# Patient Record
Sex: FEMALE | Race: WHITE | URBAN - METROPOLITAN AREA
[De-identification: names, ages, dates, MRNs, and addresses within clinical notes are randomized per-mention and may not be internally consistent; named-entity substitution may affect disease eponyms.]

---

## 2023-07-12 PROBLEM — F51.05 INSOMNIA SECONDARY TO ANXIETY: Status: ACTIVE | Noted: 2023-07-12

## 2023-07-12 PROBLEM — F41.9 INSOMNIA SECONDARY TO ANXIETY: Status: ACTIVE | Noted: 2023-07-12

## 2023-07-13 ENCOUNTER — OFFICE VISIT (OUTPATIENT)
Dept: PRIMARY CARE | Facility: CLINIC | Age: 38
End: 2023-07-13
Payer: COMMERCIAL

## 2023-07-13 ENCOUNTER — LAB (OUTPATIENT)
Dept: LAB | Facility: LAB | Age: 38
End: 2023-07-13
Payer: COMMERCIAL

## 2023-07-13 VITALS
BODY MASS INDEX: 17.89 KG/M2 | HEART RATE: 69 BPM | SYSTOLIC BLOOD PRESSURE: 113 MMHG | WEIGHT: 101 LBS | TEMPERATURE: 98.1 F | DIASTOLIC BLOOD PRESSURE: 73 MMHG

## 2023-07-13 DIAGNOSIS — M25.561 ARTHRALGIA OF BOTH KNEES: ICD-10-CM

## 2023-07-13 DIAGNOSIS — M25.562 ARTHRALGIA OF BOTH KNEES: ICD-10-CM

## 2023-07-13 DIAGNOSIS — R06.09 DYSPNEA ON EXERTION: Primary | ICD-10-CM

## 2023-07-13 DIAGNOSIS — R06.09 DYSPNEA ON EXERTION: ICD-10-CM

## 2023-07-13 DIAGNOSIS — Z00.00 ENCOUNTER FOR PREVENTATIVE ADULT HEALTH CARE EXAMINATION: ICD-10-CM

## 2023-07-13 DIAGNOSIS — F41.8 MIXED ANXIETY AND DEPRESSIVE DISORDER: ICD-10-CM

## 2023-07-13 LAB
ALANINE AMINOTRANSFERASE (SGPT) (U/L) IN SER/PLAS: 28 U/L (ref 7–45)
ALBUMIN (G/DL) IN SER/PLAS: 4.7 G/DL (ref 3.4–5)
ALKALINE PHOSPHATASE (U/L) IN SER/PLAS: 42 U/L (ref 33–110)
ANION GAP IN SER/PLAS: 14 MMOL/L (ref 10–20)
ASPARTATE AMINOTRANSFERASE (SGOT) (U/L) IN SER/PLAS: 18 U/L (ref 9–39)
BASOPHILS (10*3/UL) IN BLOOD BY AUTOMATED COUNT: 0.03 X10E9/L (ref 0–0.1)
BASOPHILS/100 LEUKOCYTES IN BLOOD BY AUTOMATED COUNT: 0.6 % (ref 0–2)
BILIRUBIN TOTAL (MG/DL) IN SER/PLAS: 0.5 MG/DL (ref 0–1.2)
C REACTIVE PROTEIN (MG/L) IN SER/PLAS: <0.1 MG/DL
CALCIDIOL (25 OH VITAMIN D3) (NG/ML) IN SER/PLAS: 22 NG/ML
CALCIUM (MG/DL) IN SER/PLAS: 9.6 MG/DL (ref 8.6–10.6)
CARBON DIOXIDE, TOTAL (MMOL/L) IN SER/PLAS: 26 MMOL/L (ref 21–32)
CHLORIDE (MMOL/L) IN SER/PLAS: 104 MMOL/L (ref 98–107)
CHOLESTEROL (MG/DL) IN SER/PLAS: 171 MG/DL (ref 0–199)
CHOLESTEROL IN HDL (MG/DL) IN SER/PLAS: 72.9 MG/DL
CHOLESTEROL/HDL RATIO: 2.3
CITRULLINE ANTIBODY, IGG: <1 U/ML
CREATININE (MG/DL) IN SER/PLAS: 0.63 MG/DL (ref 0.5–1.05)
EOSINOPHILS (10*3/UL) IN BLOOD BY AUTOMATED COUNT: 0.06 X10E9/L (ref 0–0.7)
EOSINOPHILS/100 LEUKOCYTES IN BLOOD BY AUTOMATED COUNT: 1.1 % (ref 0–6)
ERYTHROCYTE DISTRIBUTION WIDTH (RATIO) BY AUTOMATED COUNT: 12.6 % (ref 11.5–14.5)
ERYTHROCYTE MEAN CORPUSCULAR HEMOGLOBIN CONCENTRATION (G/DL) BY AUTOMATED: 31.7 G/DL (ref 32–36)
ERYTHROCYTE MEAN CORPUSCULAR VOLUME (FL) BY AUTOMATED COUNT: 98 FL (ref 80–100)
ERYTHROCYTES (10*6/UL) IN BLOOD BY AUTOMATED COUNT: 4.49 X10E12/L (ref 4–5.2)
GFR FEMALE: >90 ML/MIN/1.73M2
GLUCOSE (MG/DL) IN SER/PLAS: 75 MG/DL (ref 74–99)
HEMATOCRIT (%) IN BLOOD BY AUTOMATED COUNT: 43.8 % (ref 36–46)
HEMOGLOBIN (G/DL) IN BLOOD: 13.9 G/DL (ref 12–16)
IMMATURE GRANULOCYTES/100 LEUKOCYTES IN BLOOD BY AUTOMATED COUNT: 0.2 % (ref 0–0.9)
LDL: 86 MG/DL (ref 0–99)
LEUKOCYTES (10*3/UL) IN BLOOD BY AUTOMATED COUNT: 5.3 X10E9/L (ref 4.4–11.3)
LYMPHOCYTES (10*3/UL) IN BLOOD BY AUTOMATED COUNT: 1.6 X10E9/L (ref 1.2–4.8)
LYMPHOCYTES/100 LEUKOCYTES IN BLOOD BY AUTOMATED COUNT: 30 % (ref 13–44)
MONOCYTES (10*3/UL) IN BLOOD BY AUTOMATED COUNT: 0.46 X10E9/L (ref 0.1–1)
MONOCYTES/100 LEUKOCYTES IN BLOOD BY AUTOMATED COUNT: 8.6 % (ref 2–10)
NEUTROPHILS (10*3/UL) IN BLOOD BY AUTOMATED COUNT: 3.18 X10E9/L (ref 1.2–7.7)
NEUTROPHILS/100 LEUKOCYTES IN BLOOD BY AUTOMATED COUNT: 59.5 % (ref 40–80)
NRBC (PER 100 WBCS) BY AUTOMATED COUNT: 0 /100 WBC (ref 0–0)
PLATELETS (10*3/UL) IN BLOOD AUTOMATED COUNT: 244 X10E9/L (ref 150–450)
POTASSIUM (MMOL/L) IN SER/PLAS: 4.5 MMOL/L (ref 3.5–5.3)
PROTEIN TOTAL: 7.3 G/DL (ref 6.4–8.2)
RHEUMATOID FACTOR (IU/ML) IN SERUM OR PLASMA: <10 IU/ML (ref 0–15)
SEDIMENTATION RATE, ERYTHROCYTE: <1 MM/H (ref 0–20)
SODIUM (MMOL/L) IN SER/PLAS: 139 MMOL/L (ref 136–145)
THYROTROPIN (MIU/L) IN SER/PLAS BY DETECTION LIMIT <= 0.05 MIU/L: 1.02 MIU/L (ref 0.44–3.98)
TRIGLYCERIDE (MG/DL) IN SER/PLAS: 63 MG/DL (ref 0–149)
UREA NITROGEN (MG/DL) IN SER/PLAS: 9 MG/DL (ref 6–23)
VLDL: 13 MG/DL (ref 0–40)

## 2023-07-13 PROCEDURE — 93000 ELECTROCARDIOGRAM COMPLETE: CPT | Performed by: INTERNAL MEDICINE

## 2023-07-13 PROCEDURE — 84443 ASSAY THYROID STIM HORMONE: CPT

## 2023-07-13 PROCEDURE — 86431 RHEUMATOID FACTOR QUANT: CPT

## 2023-07-13 PROCEDURE — 36415 COLL VENOUS BLD VENIPUNCTURE: CPT

## 2023-07-13 PROCEDURE — 99214 OFFICE O/P EST MOD 30 MIN: CPT | Performed by: INTERNAL MEDICINE

## 2023-07-13 PROCEDURE — 85025 COMPLETE CBC W/AUTO DIFF WBC: CPT

## 2023-07-13 PROCEDURE — 80061 LIPID PANEL: CPT

## 2023-07-13 PROCEDURE — 85652 RBC SED RATE AUTOMATED: CPT

## 2023-07-13 PROCEDURE — 83036 HEMOGLOBIN GLYCOSYLATED A1C: CPT

## 2023-07-13 PROCEDURE — 1036F TOBACCO NON-USER: CPT | Performed by: INTERNAL MEDICINE

## 2023-07-13 PROCEDURE — 80053 COMPREHEN METABOLIC PANEL: CPT

## 2023-07-13 PROCEDURE — 82306 VITAMIN D 25 HYDROXY: CPT

## 2023-07-13 PROCEDURE — 86140 C-REACTIVE PROTEIN: CPT

## 2023-07-13 PROCEDURE — 86200 CCP ANTIBODY: CPT

## 2023-07-13 RX ORDER — MIRTAZAPINE 7.5 MG/1
1 TABLET, FILM COATED ORAL NIGHTLY
COMMUNITY
Start: 2022-05-17 | End: 2023-07-13 | Stop reason: ALTCHOICE

## 2023-07-13 NOTE — PROGRESS NOTES
Subjective   Patient ID: Rosa Andrew is a 38 y.o. female who presents for NEW PT VISIT .  HPI  38-year-old female new patient of Dr. Silva here for establishment of care, last seen in July of last year.  Labs performed last year largely unremarkable.  She is concerned about possible long COVID.    COVID - 3-4 times most recently at the end of March. Notes since then has not been feeling herself, fatigue despite adequate sleep, forgetful, not able to be her dynamic self at work, feels that she is struggling. Gets intermittent palpitations and severe wiped out with exertion. Notes exhaustion and out of breath even when thinking about something to hard. Believes that anxiety and burnout are contributing but has had longstanding anxiety and this feels different. She had similar symptoms similar to this after covid infections   COVID in 2020 prevaccine - resulted in severe illness - symptoms started to improve after 6 months though unclear what her baseline is, then covid again with similar symptoms took a few months to recover.   Current symptoms are similar, sleep is better, has been working with herbalist using lavendar oil and chamomille tincture which has been allowing her to sleep. She also quit drinking and was diligent about her sleep routines. She continues to feel exhausted all the time. Has joint pains in her wrists, ankles and knees that is worst in the morning takes 1-2 hours to recover.    Has also been experiencing pain in the tops of her arms and backs of her legs.     Presented to the ED in April for leg cramps and shortness of breath. D-dimer negative, EKG unremarkable sinus arrhythmia, doppler lower extremities normal.     PMHx:  - Anxiety and insomnia previously on remeron which stopped working for her   - Old neck injury     Social:   - Former smoker - quit 2 months ago, 1-2 cigs/day, in mid to late 20s smoked 1/2 PPD x 5 years   - Former mild alcohol use, now discontinued - 1 drink would  result in horrible hangover or headache   - Intermittent marijuana   -    - Lives at home with roommate, going through a divorce ( for a few years)  No current outpatient medications     Objective     /73   Pulse 69   Temp 36.7 °C (98.1 °F)   Wt 45.8 kg (101 lb)   BMI 17.89 kg/m²     Physical Exam  General: Appears comfortable, NAD, appropriate affect  HEENT: NCAT, EOMI, pupils symmetric, no conjunctival erythema   Neck: Supple, no LAD   Heart: RRR S1 S2 no murmurs appreciated   Lungs: CTA bilaterally, no rhonchi, rales, or wheezes   Abdomen: Soft, NT/ND, no rebound or guarding, NABS   Extremities: no cyanosis or edema appreciated, reproducible to bilateral wrists and knees, no reduced range of motion, no swelling or overlying skin changes.  Neuro: AAO x 3, answers questions appropriately, no FND, gait unremarkable    Assessment/Plan   Problem List Items Addressed This Visit       Mixed anxiety and depressive disorder  Longstanding, self manages at present, previously followed by therapist, not currently interested in medical management.  Discussed consideration for Cymbalta to address polyarthritis with possible diagnosis of fibromyalgia but wishes to hold off on this for now.  Interested in I referral, will refer.    Relevant Orders    Follow Up In [facility] - [facility]     Other Visit Diagnoses       Dyspnea on exertion    -  Primary  Post COVID, with episodes in the past that improved.  There are no alarm features.  We will obtain work-up including blood work and a chest x-ray.  EKG reviewed unremarkable.  Referred to long COVID clinic.    Relevant Orders    CBC and Auto Differential    Comprehensive Metabolic Panel    TSH with reflex to Free T4 if abnormal    ECG 12 lead (Clinic Performed)    XR chest 2 views    Referral to COVID Recovery Clinic    Encounter for preventative adult health care examination      Labs in anticipation for  preventive care visit at next visit    Relevant Orders    Follow Up In [facility] - PCP - Health Maintenance    Arthralgia of both knees      Diven prolonged recovery time in the morning and bilateral symptoms will obtain inflammatory work-up.    Relevant Orders    C-Reactive Protein    Sedimentation Rate    Rheumatoid Factor    Citrulline Antibody, IgG     Follow-up for preventive care visit at next visit

## 2023-07-13 NOTE — PATIENT INSTRUCTIONS
It was a pleasure to see you today! Here is a list of things we have discussed and to follow up on:   I have ordered blood and/or urine tests for you to do today. The lab can be found on this floor (2nd floor) next to the pharmacy across from the elevators.   Stop by the first floor for your x-ray.   I have referred you to the COVID clinic to see if any additional workup is recommended   Referral to behavioral health to discuss depression and anxiety - Laron should be getting in touch with you.   Followup in 3 months

## 2023-07-14 LAB
ESTIMATED AVERAGE GLUCOSE FOR HBA1C: 97 MG/DL
HEMOGLOBIN A1C/HEMOGLOBIN TOTAL IN BLOOD: 5 %

## 2023-09-07 DIAGNOSIS — U09.9 LONG COVID: Primary | ICD-10-CM

## 2023-10-17 ENCOUNTER — APPOINTMENT (OUTPATIENT)
Dept: PRIMARY CARE | Facility: CLINIC | Age: 38
End: 2023-10-17
Payer: COMMERCIAL

## 2023-10-17 NOTE — PROGRESS NOTES
Subjective   Patient ID: Rosa Andrew is a 38 y.o. female who presents for No chief complaint on file..  HPI  38-year-old female here for follow-up visit, last seen for establishment of care in July out of concern for dyspnea on exertion post-COVID, referred to long COVID clinic and obtain EKG and chest x-ray also with arthralgia of her joints rheum work-up thus far unremarkable.  This was to have been a preventive care visit but was not scheduled as such.    7/23: Labs all good  8/23 no response to [facility]    PMHx:  - Anxiety and insomnia previously on remeron which stopped working for her-declined medical management at last visit referred to behavioral health discussed consideration for Cymbalta  - Old neck injury      Social:   - Former smoker - quit 2 months ago, 1-2 cigs/day, in mid to late 20s smoked 1/2 PPD x 5 years   - Former mild alcohol use, now discontinued - 1 drink would result in horrible hangover or headache   - Intermittent marijuana   -    - Lives at home with roommate, going through a divorce ( for a few years)    No current outpatient medications     Objective     There were no vitals taken for this visit.    Physical Exam    Assessment/Plan   Problem List Items Addressed This Visit    None    Health Maintenance  Cancer screening:   - Pap   Immunizations:

## 2023-11-14 ENCOUNTER — OFFICE VISIT (OUTPATIENT)
Dept: PRIMARY CARE | Facility: CLINIC | Age: 38
End: 2023-11-14
Payer: COMMERCIAL

## 2023-11-14 VITALS
BODY MASS INDEX: 17.71 KG/M2 | SYSTOLIC BLOOD PRESSURE: 121 MMHG | TEMPERATURE: 97.8 F | HEART RATE: 79 BPM | WEIGHT: 100 LBS | DIASTOLIC BLOOD PRESSURE: 77 MMHG

## 2023-11-14 DIAGNOSIS — U09.9 COVID-19 LONG HAULER: ICD-10-CM

## 2023-11-14 DIAGNOSIS — D17.0 LIPOMA OF FACE: Primary | ICD-10-CM

## 2023-11-14 DIAGNOSIS — M94.9 DISORDER OF CARTILAGE: ICD-10-CM

## 2023-11-14 DIAGNOSIS — F41.8 MIXED ANXIETY AND DEPRESSIVE DISORDER: ICD-10-CM

## 2023-11-14 PROCEDURE — 99214 OFFICE O/P EST MOD 30 MIN: CPT | Performed by: INTERNAL MEDICINE

## 2023-11-14 PROCEDURE — 1036F TOBACCO NON-USER: CPT | Performed by: INTERNAL MEDICINE

## 2023-11-14 RX ORDER — BUPROPION HYDROCHLORIDE 150 MG/1
150 TABLET ORAL EVERY MORNING
Qty: 30 TABLET | Refills: 1 | Status: SHIPPED | OUTPATIENT
Start: 2023-11-14 | End: 2024-01-27

## 2023-11-14 ASSESSMENT — PATIENT HEALTH QUESTIONNAIRE - PHQ9
10. IF YOU CHECKED OFF ANY PROBLEMS, HOW DIFFICULT HAVE THESE PROBLEMS MADE IT FOR YOU TO DO YOUR WORK, TAKE CARE OF THINGS AT HOME, OR GET ALONG WITH OTHER PEOPLE: VERY DIFFICULT
6. FEELING BAD ABOUT YOURSELF - OR THAT YOU ARE A FAILURE OR HAVE LET YOURSELF OR YOUR FAMILY DOWN: SEVERAL DAYS
7. TROUBLE CONCENTRATING ON THINGS, SUCH AS READING THE NEWSPAPER OR WATCHING TELEVISION: NEARLY EVERY DAY
5. POOR APPETITE OR OVEREATING: SEVERAL DAYS
9. THOUGHTS THAT YOU WOULD BE BETTER OFF DEAD, OR OF HURTING YOURSELF: SEVERAL DAYS
2. FEELING DOWN, DEPRESSED OR HOPELESS: MORE THAN HALF THE DAYS
SUM OF ALL RESPONSES TO PHQ9 QUESTIONS 1 & 2: 3
8. MOVING OR SPEAKING SO SLOWLY THAT OTHER PEOPLE COULD HAVE NOTICED. OR THE OPPOSITE, BEING SO FIGETY OR RESTLESS THAT YOU HAVE BEEN MOVING AROUND A LOT MORE THAN USUAL: MORE THAN HALF THE DAYS
SUM OF ALL RESPONSES TO PHQ QUESTIONS 1-9: 17
1. LITTLE INTEREST OR PLEASURE IN DOING THINGS: SEVERAL DAYS
4. FEELING TIRED OR HAVING LITTLE ENERGY: NEARLY EVERY DAY
3. TROUBLE FALLING OR STAYING ASLEEP: NEARLY EVERY DAY

## 2023-11-14 NOTE — PATIENT INSTRUCTIONS
Rosa   For [facility] clinic - see valeri [facility] at [facility] for assistance with scheduling  For SAD - purchase a bright light lamp 10,000 lux. Sit under this first thing in the morning for 30-60 minutes   For depressive symptoms - start WELLBUTRIN. Take 1/2 tablet for the first 1 week and then uptitrate to 1 full tablet daily if tolerating.   Lipoma - I have you referred you to general surgery. I recommend either Dr. Rajeev Valadez or Dr. Elroy Daily. You can stop by next door to [address] or call [phone] to have this scheduled.   TMJ things - referral to oral surgery     Followup 1-2 months

## 2023-11-14 NOTE — PROGRESS NOTES
"Subjective   Patient ID: Rosa Andrew is a 38 y.o. female who presents for Follow-up.  HPI  38-year-old female here for follow-up visit, last seen for establishment of care in July out of concern for dyspnea on exertion post-COVID, referred to long COVID clinic and obtain EKG and chest x-ray also with arthralgia of her joints rheum work-up thus far unremarkable.  This was to have been a preventive care visit but was not scheduled as such. Symptoms are largely unchanged since last being seen. She is following up with a therapist, now more accepting to her situation but still with severe fatigue all the time no matter what. She started to take an electrolyte packet a day which seems to help with brain fog slightly. She had trouble scheduling with the COVID clinic.   - Experiencing lipoma of forehead interested in consideration for removal, has had to wear bangs to cover it up.   - Teeth grinds and has TMJ with it was found to something with her jaw that causes tension in her neck.       7/23: Labs all good  8/23 no response to [facility]  10/23: FMLA form completed     PMHx:  - Anxiety and insomnia - now more \"worn out\" and possible depressive symptoms. previously on remeron which stopped working for her-declined medical management at last visit referred to behavioral health discussed consideration for Cymbalta.  Today she may be considering a low dose antidepressant but is concerned about her sensitivity to side effects. She has tried zoloft, prozac, mirtazapine, remote xanx in the past All of them resulted in more flatness as opposed to being functional. Unclear if now symptoms are related to physical exhaustion. Some component of SAD as well. Has had thoughts of being better off dead but no plans.   - Old neck injury      Social:   - Former smoker - quit 2 months ago, 1-2 cigs/day, in mid to late 20s smoked 1/2 PPD x 5 years   - Former mild alcohol use, now discontinued - 1 drink would result in horrible hangover or " headache   - Intermittent marijuana   -    - Lives at home with roommate, going through a divorce ( for a few years)     Current Outpatient Medications   Medication Instructions    buPROPion XL (WELLBUTRIN XL) 150 mg, oral, Every morning, Do not crush, chew, or split.        Objective     /77   Pulse 79   Temp 36.6 °C (97.8 °F)   Wt 45.4 kg (100 lb)   BMI 17.71 kg/m²     Physical Exam  General: Alert and oriented, in no apparent distress   HEENT: No conjunctival erythema, no external facial lesions   Lungs: Breathing comfortably  Skin: No evidence of skin breakdown. Lipoma on right upper forehead   Neuro: AAO x 3, answering questions appropriately, no obvious cranial nerve deficits    Assessment/Plan   Problem List Items Addressed This Visit       Mixed anxiety and depressive disorder   with significant symptoms of depression in association with anxiety.  Also with anhedonia.  Follow-up Wellbutrin to possibly assist with low mood.  We will start at half tab and uptitrate as tolerated.  Continue to follow-up with therapist.  We will follow-up in 1 to 2 months closely.    Relevant Medications    buPROPion XL (Wellbutrin XL) 150 mg 24 hr tablet    Other Relevant Orders    Follow Up In [facility] - PCP - Established     Other Visit Diagnoses       Lipoma of face    -  Primary  Interested in consideration for excision    No alarm features, intersted in excision.    Relevant Orders    Referral to Plastic Surgery    Referral to General Surgery    Disorder of cartilage        Relevant Orders    Referral to Oral Maxillofacial Surgery        COVID-19 long hauler    Current Assessment & Plan     With long-term symptoms, refer to COVID clinic  FMLA paperwork completed       Relevant Orders    Referral to COVID Recovery Clinic           Follow-up in 1 to 2 months

## 2024-01-01 SDOH — ECONOMIC STABILITY: FOOD INSECURITY: WITHIN THE PAST 12 MONTHS, THE FOOD YOU BOUGHT JUST DIDN’T LAST AND YOU DIDN’T HAVE MONEY TO GET MORE.: NEVER TRUE

## 2024-01-01 SDOH — HEALTH STABILITY: PHYSICAL HEALTH: ON AVERAGE, HOW MANY DAYS PER WEEK DO YOU ENGAGE IN MODERATE TO STRENUOUS EXERCISE (LIKE A BRISK WALK)?: 5 DAYS

## 2024-01-01 SDOH — ECONOMIC STABILITY: FOOD INSECURITY: WITHIN THE PAST 12 MONTHS, THE FOOD YOU BOUGHT JUST DIDN'T LAST AND YOU DIDN'T HAVE MONEY TO GET MORE.: NEVER TRUE

## 2024-01-01 SDOH — ECONOMIC STABILITY: HOUSING INSECURITY: IN THE LAST 12 MONTHS, HOW MANY PLACES HAVE YOU LIVED?: 2

## 2024-01-01 SDOH — HEALTH STABILITY: PHYSICAL HEALTH: ON AVERAGE, HOW MANY MINUTES DO YOU ENGAGE IN EXERCISE AT THIS LEVEL?: 60 MIN

## 2024-01-01 SDOH — ECONOMIC STABILITY: HOUSING INSECURITY
IN THE LAST 12 MONTHS, WAS THERE A TIME WHEN YOU DID NOT HAVE A STEADY PLACE TO SLEEP OR SLEPT IN A SHELTER (INCLUDING NOW)?: NO

## 2024-01-01 SDOH — ECONOMIC STABILITY: TRANSPORTATION INSECURITY
IN THE PAST 12 MONTHS, HAS THE LACK OF TRANSPORTATION KEPT YOU FROM MEDICAL APPOINTMENTS OR FROM GETTING MEDICATIONS?: NO

## 2024-01-01 SDOH — ECONOMIC STABILITY: INCOME INSECURITY: HOW HARD IS IT FOR YOU TO PAY FOR THE VERY BASICS LIKE FOOD, HOUSING, MEDICAL CARE, AND HEATING?: NOT VERY HARD

## 2024-01-01 SDOH — ECONOMIC STABILITY: INCOME INSECURITY: IN THE LAST 12 MONTHS, WAS THERE A TIME WHEN YOU WERE NOT ABLE TO PAY THE MORTGAGE OR RENT ON TIME?: NO

## 2024-01-01 SDOH — HEALTH STABILITY: MENTAL HEALTH
STRESS IS WHEN SOMEONE FEELS TENSE, NERVOUS, ANXIOUS, OR CAN'T SLEEP AT NIGHT BECAUSE THEIR MIND IS TROUBLED. HOW STRESSED ARE YOU?: TO SOME EXTENT

## 2024-01-01 SDOH — ECONOMIC STABILITY: FOOD INSECURITY: WITHIN THE PAST 12 MONTHS, YOU WORRIED THAT YOUR FOOD WOULD RUN OUT BEFORE YOU GOT MONEY TO BUY MORE.: NEVER TRUE

## 2024-01-01 SDOH — ECONOMIC STABILITY: TRANSPORTATION INSECURITY
IN THE PAST 12 MONTHS, HAS LACK OF TRANSPORTATION KEPT YOU FROM MEETINGS, WORK, OR FROM GETTING THINGS NEEDED FOR DAILY LIVING?: NO

## 2024-01-01 ASSESSMENT — SLEEP AND FATIGUE QUESTIONNAIRES
FATIGUE_INTERFERES_SOCIAL_LIFE: 7 STRONGLY AGREE
FATIGUE_MOST_DISABILING_SYMPTOM: 7 STRONGLY AGREE
VISUAL ANALOGUE FATIGUE SCALE (VAFS): 2
FATIGUE_CAUSES_FREQUENT_PROBLEMTS: 6
FATIGUE_INTERFERES_PHYSICAL_FUNCTIONING: 6
MY FATIGUE PREVENTS SUSTAINED PHYSICAL FUNCTIONING.: 6
EASILY_FATIGUED: 7 STRONGLY AGREE
FATIGUE_INTERFERES_SOCIAL_LIFE: 7 STRONGLY AGREE
MY MOTIVATION IS LOWER WHEN I AM FATIGUED.: 7 STRONGLY AGREE
MY MOTIVATION IS LOWER WHEN I AM FATIGUED.: 7 STRONGLY AGREE
FATIGUE_CAUSES_FREQUENT_PROBLEMTS: 6
MY FATIGUE PREVENTS SUSTAINED PHYSICAL FUNCTIONING.: 6
FATIGUE_INTERFERES_RESPONSIBILITIES: 6
FATIGUE_MOST_DISABILING_SYMPTOM: 7 STRONGLY AGREE
FATIGUE_INTERFERES_PHYSICAL_FUNCTIONING: 6
EASILY_FATIGUED: 7 STRONGLY AGREE
EXERCISE_BRINGS_ON_FATIGUE: 7 STRONGLY AGREE
EXERCISE_BRINGS_ON_FATIGUE: 7 STRONGLY AGREE
FATIGUE_INTERFERES_RESPONSIBILITIES: 6
AVERAGE_FSS_SCORE: 6.56

## 2024-01-01 ASSESSMENT — ANXIETY QUESTIONNAIRES
6. BECOMING EASILY ANNOYED OR IRRITABLE: NEARLY EVERY DAY
7. FEELING AFRAID AS IF SOMETHING AWFUL MIGHT HAPPEN: MORE THAN HALF THE DAYS
2. NOT BEING ABLE TO STOP OR CONTROL WORRYING: MORE THAN HALF THE DAYS
2. NOT BEING ABLE TO STOP OR CONTROL WORRYING: MORE THAN HALF THE DAYS
IF YOU CHECKED OFF ANY PROBLEMS ON THIS QUESTIONNAIRE, HOW DIFFICULT HAVE THESE PROBLEMS MADE IT FOR YOU TO DO YOUR WORK, TAKE CARE OF THINGS AT HOME, OR GET ALONG WITH OTHER PEOPLE: SOMEWHAT DIFFICULT
5. BEING SO RESTLESS THAT IT IS HARD TO SIT STILL: SEVERAL DAYS
1. FEELING NERVOUS, ANXIOUS, OR ON EDGE: MORE THAN HALF THE DAYS
6. BECOMING EASILY ANNOYED OR IRRITABLE: NEARLY EVERY DAY
IF YOU CHECKED OFF ANY PROBLEMS ON THIS QUESTIONNAIRE, HOW DIFFICULT HAVE THESE PROBLEMS MADE IT FOR YOU TO DO YOUR WORK, TAKE CARE OF THINGS AT HOME, OR GET ALONG WITH OTHER PEOPLE: SOMEWHAT DIFFICULT
4. TROUBLE RELAXING: SEVERAL DAYS
GAD7 TOTAL SCORE: 12
4. TROUBLE RELAXING: SEVERAL DAYS
7. FEELING AFRAID AS IF SOMETHING AWFUL MIGHT HAPPEN: MORE THAN HALF THE DAYS
1. FEELING NERVOUS, ANXIOUS, OR ON EDGE: MORE THAN HALF THE DAYS
5. BEING SO RESTLESS THAT IT IS HARD TO SIT STILL: SEVERAL DAYS
3. WORRYING TOO MUCH ABOUT DIFFERENT THINGS: SEVERAL DAYS
3. WORRYING TOO MUCH ABOUT DIFFERENT THINGS: SEVERAL DAYS

## 2024-01-01 ASSESSMENT — SOCIAL DETERMINANTS OF HEALTH (SDOH)
IN THE PAST 12 MONTHS, HAS THE ELECTRIC, GAS, OIL, OR WATER COMPANY THREATENED TO SHUT OFF SERVICE IN YOUR HOME?: NO
HOW OFTEN DO YOU GET TOGETHER WITH FRIENDS OR RELATIVES?: TWICE A WEEK
HOW OFTEN DO YOU ATTENT MEETINGS OF THE CLUB OR ORGANIZATION YOU BELONG TO?: 1 TO 4 TIMES PER YEAR
WITHIN THE LAST YEAR, HAVE YOU BEEN HUMILIATED OR EMOTIONALLY ABUSED IN OTHER WAYS BY YOUR PARTNER OR EX-PARTNER?: NO
IN A TYPICAL WEEK, HOW MANY TIMES DO YOU TALK ON THE PHONE WITH FAMILY, FRIENDS, OR NEIGHBORS?: MORE THAN THREE TIMES A WEEK
WITHIN THE LAST YEAR, HAVE TO BEEN RAPED OR FORCED TO HAVE ANY KIND OF SEXUAL ACTIVITY BY YOUR PARTNER OR EX-PARTNER?: NO
WITHIN THE LAST YEAR, HAVE YOU BEEN AFRAID OF YOUR PARTNER OR EX-PARTNER?: NO
IN A TYPICAL WEEK, HOW MANY TIMES DO YOU TALK ON THE PHONE WITH FAMILY, FRIENDS, OR NEIGHBORS?: MORE THAN THREE TIMES A WEEK
HOW OFTEN DO YOU ATTENT MEETINGS OF THE CLUB OR ORGANIZATION YOU BELONG TO?: 1 TO 4 TIMES PER YEAR
IN THE PAST 12 MONTHS, HAS THE ELECTRIC, GAS, OIL, OR WATER COMPANY THREATENED TO SHUT OFF SERVICE IN YOUR HOME?: NO
WITHIN THE LAST YEAR, HAVE TO BEEN RAPED OR FORCED TO HAVE ANY KIND OF SEXUAL ACTIVITY BY YOUR PARTNER OR EX-PARTNER?: NO
WITHIN THE LAST YEAR, HAVE YOU BEEN AFRAID OF YOUR PARTNER OR EX-PARTNER?: NO
ARE YOU MARRIED, WIDOWED, DIVORCED, SEPARATED, NEVER MARRIED, OR LIVING WITH A PARTNER?: LIVING WITH PARTNER
ARE YOU MARRIED, WIDOWED, DIVORCED, SEPARATED, NEVER MARRIED, OR LIVING WITH A PARTNER?: LIVING WITH PARTNER
WITHIN THE LAST YEAR, HAVE YOU BEEN KICKED, HIT, SLAPPED, OR OTHERWISE PHYSICALLY HURT BY YOUR PARTNER OR EX-PARTNER?: NO
WITHIN THE LAST YEAR, HAVE YOU BEEN HUMILIATED OR EMOTIONALLY ABUSED IN OTHER WAYS BY YOUR PARTNER OR EX-PARTNER?: NO
HOW OFTEN DO YOU ATTEND CHURCH OR RELIGIOUS SERVICES?: PATIENT DECLINED
HOW OFTEN DO YOU GET TOGETHER WITH FRIENDS OR RELATIVES?: TWICE A WEEK
DO YOU BELONG TO ANY CLUBS OR ORGANIZATIONS SUCH AS CHURCH GROUPS UNIONS, FRATERNAL OR ATHLETIC GROUPS, OR SCHOOL GROUPS?: YES
HOW OFTEN DO YOU ATTEND CHURCH OR RELIGIOUS SERVICES?: PATIENT DECLINED
WITHIN THE LAST YEAR, HAVE YOU BEEN KICKED, HIT, SLAPPED, OR OTHERWISE PHYSICALLY HURT BY YOUR PARTNER OR EX-PARTNER?: NO
DO YOU BELONG TO ANY CLUBS OR ORGANIZATIONS SUCH AS CHURCH GROUPS UNIONS, FRATERNAL OR ATHLETIC GROUPS, OR SCHOOL GROUPS?: YES

## 2024-01-01 ASSESSMENT — PATIENT HEALTH QUESTIONNAIRE - PHQ9
6. FEELING BAD ABOUT YOURSELF - OR THAT YOU ARE A FAILURE OR HAVE LET YOURSELF OR YOUR FAMILY DOWN: NOT AT ALL
1. LITTLE INTEREST OR PLEASURE IN DOING THINGS: NOT AT ALL
7. TROUBLE CONCENTRATING ON THINGS, SUCH AS READING THE NEWSPAPER OR WATCHING TELEVISION: SEVERAL DAYS
4. FEELING TIRED OR HAVING LITTLE ENERGY: NEARLY EVERY DAY
2. FEELING DOWN, DEPRESSED OR HOPELESS: SEVERAL DAYS
2. FEELING DOWN, DEPRESSED OR HOPELESS: SEVERAL DAYS
1. LITTLE INTEREST OR PLEASURE IN DOING THINGS: NOT AT ALL
7. TROUBLE CONCENTRATING ON THINGS, SUCH AS READING THE NEWSPAPER OR WATCHING TELEVISION: SEVERAL DAYS
8. MOVING OR SPEAKING SO SLOWLY THAT OTHER PEOPLE COULD HAVE NOTICED. OR THE OPPOSITE, BEING SO FIGETY OR RESTLESS THAT YOU HAVE BEEN MOVING AROUND A LOT MORE THAN USUAL: SEVERAL DAYS
10. IF YOU CHECKED OFF ANY PROBLEMS, HOW DIFFICULT HAVE THESE PROBLEMS MADE IT FOR YOU TO DO YOUR WORK, TAKE CARE OF THINGS AT HOME, OR GET ALONG WITH OTHER PEOPLE: NOT DIFFICULT AT ALL
5. POOR APPETITE OR OVEREATING: NOT AT ALL
10. IF YOU CHECKED OFF ANY PROBLEMS, HOW DIFFICULT HAVE THESE PROBLEMS MADE IT FOR YOU TO DO YOUR WORK, TAKE CARE OF THINGS AT HOME, OR GET ALONG WITH OTHER PEOPLE: NOT DIFFICULT AT ALL
5. POOR APPETITE OR OVEREATING: NOT AT ALL
9. THOUGHTS THAT YOU WOULD BE BETTER OFF DEAD, OR OF HURTING YOURSELF: NOT AT ALL
SUM OF ALL RESPONSES TO PHQ9 QUESTIONS 1 & 2: 1
9. THOUGHTS THAT YOU WOULD BE BETTER OFF DEAD, OR OF HURTING YOURSELF: NOT AT ALL
3. TROUBLE FALLING OR STAYING ASLEEP OR SLEEPING TOO MUCH: NOT AT ALL
SUM OF ALL RESPONSES TO PHQ QUESTIONS 1-9: 6
6. FEELING BAD ABOUT YOURSELF - OR THAT YOU ARE A FAILURE OR HAVE LET YOURSELF OR YOUR FAMILY DOWN: NOT AT ALL
3. TROUBLE FALLING OR STAYING ASLEEP: NOT AT ALL
8. MOVING OR SPEAKING SO SLOWLY THAT OTHER PEOPLE COULD HAVE NOTICED. OR THE OPPOSITE - BEING SO FIDGETY OR RESTLESS THAT YOU HAVE BEEN MOVING AROUND A LOT MORE THAN USUAL: SEVERAL DAYS
4. FEELING TIRED OR HAVING LITTLE ENERGY: NEARLY EVERY DAY

## 2024-01-01 ASSESSMENT — LIFESTYLE VARIABLES
HOW OFTEN DO YOU HAVE SIX OR MORE DRINKS ON ONE OCCASION: NEVER
AUDIT-C TOTAL SCORE: 1
DO_YOU_DRINK?: I DO THIS LESS OFTEN
HOW MANY STANDARD DRINKS CONTAINING ALCOHOL DO YOU HAVE ON A TYPICAL DAY: 1 OR 2
HOW MANY STANDARD DRINKS CONTAINING ALCOHOL DO YOU HAVE ON A TYPICAL DAY: 1 OR 2
USE_ALCOHOL_TO_HELP_SLEEP: YES
SKIP TO QUESTIONS 9-10: 1
HOW OFTEN DO YOU HAVE A DRINK CONTAINING ALCOHOL: MONTHLY OR LESS
HOW OFTEN DO YOU HAVE 6 OR MORE DRINKS ON ONE OCCASION: NEVER
HOW OFTEN DO YOU HAVE A DRINK CONTAINING ALCOHOL: MONTHLY OR LESS

## 2024-01-12 PROBLEM — R43.0 LOSS OF SENSE OF SMELL: Status: ACTIVE | Noted: 2021-02-04

## 2024-01-12 PROBLEM — R20.2 PARESTHESIA OF HAND, BILATERAL: Status: ACTIVE | Noted: 2021-02-04

## 2024-01-12 PROBLEM — M79.18 MYOFASCIAL MUSCLE PAIN: Status: ACTIVE | Noted: 2019-08-20

## 2024-01-12 PROBLEM — N94.3 PMS (PREMENSTRUAL SYNDROME): Status: ACTIVE | Noted: 2018-03-22

## 2024-01-12 PROBLEM — L20.84 INTRINSIC ECZEMA: Status: ACTIVE | Noted: 2018-03-22

## 2024-01-12 PROBLEM — G47.63 SLEEP-RELATED BRUXISM: Status: ACTIVE | Noted: 2019-08-20

## 2024-01-12 PROBLEM — F41.9 ANXIETY AND DEPRESSION: Status: ACTIVE | Noted: 2018-03-22

## 2024-01-12 PROBLEM — G47.01 INSOMNIA DUE TO MEDICAL CONDITION: Status: ACTIVE | Noted: 2021-02-04

## 2024-01-12 PROBLEM — R53.83 MALAISE AND FATIGUE: Status: ACTIVE | Noted: 2018-03-22

## 2024-01-12 PROBLEM — D17.0 LIPOMA OF SCALP: Status: ACTIVE | Noted: 2024-01-12

## 2024-01-12 PROBLEM — G62.9 POLYNEUROPATHY: Status: ACTIVE | Noted: 2021-02-04

## 2024-01-12 PROBLEM — D22.9 NEVUS: Status: ACTIVE | Noted: 2024-01-12

## 2024-01-12 PROBLEM — F32.A ANXIETY AND DEPRESSION: Status: ACTIVE | Noted: 2018-03-22

## 2024-01-12 PROBLEM — R19.7 DIARRHEA OF PRESUMED INFECTIOUS ORIGIN: Status: ACTIVE | Noted: 2018-03-22

## 2024-01-12 PROBLEM — F33.9 RECURRENT MAJOR DEPRESSION RESISTANT TO TREATMENT (CMS-HCC): Status: ACTIVE | Noted: 2020-07-28

## 2024-01-12 PROBLEM — R20.2 PARESTHESIA OF BOTH FEET: Status: ACTIVE | Noted: 2021-02-04

## 2024-01-12 PROBLEM — R43.2 LOSS OF TASTE: Status: ACTIVE | Noted: 2021-02-04

## 2024-01-12 PROBLEM — R53.83 FATIGUE: Status: ACTIVE | Noted: 2024-01-12

## 2024-01-12 PROBLEM — R09.89 CHRONIC SINUS COMPLAINTS: Status: ACTIVE | Noted: 2018-03-22

## 2024-01-12 PROBLEM — M54.2 NECK PAIN: Status: ACTIVE | Noted: 2018-03-22

## 2024-01-12 PROBLEM — Z77.120 MOLD EXPOSURE: Status: ACTIVE | Noted: 2018-08-16

## 2024-01-12 PROBLEM — R53.81 MALAISE AND FATIGUE: Status: ACTIVE | Noted: 2018-03-22

## 2024-01-12 PROBLEM — G44.219 EPISODIC TENSION-TYPE HEADACHE: Status: ACTIVE | Noted: 2018-03-22

## 2024-01-16 ENCOUNTER — CLINICAL SUPPORT (OUTPATIENT)
Dept: OTHER | Facility: CLINIC | Age: 39
End: 2024-01-16
Payer: COMMERCIAL

## 2024-01-16 DIAGNOSIS — Z86.16 PERSONAL HISTORY OF COVID-19: ICD-10-CM

## 2024-01-16 RX ORDER — MULTIVIT-MIN/IRON FUM/FOLIC AC 7.5 MG-4
1 TABLET ORAL DAILY
COMMUNITY

## 2024-01-16 ASSESSMENT — MONTREAL COGNITIVE ASSESSMENT (MOCA)
13. ORIENTATION SUBSCORE: 5
11. FOR EACH PAIR OF WORDS, WHAT CATEGORY DO THEY BELONG TO (OUT OF 2): 2
6. READ LIST OF DIGITS [FORWARD/BACKWARD]: 2
9. REPEAT EACH SENTENCE: 2
WHAT IS THE TOTAL SCORE (OUT OF 30): 18
4. NAME EACH OF THE THREE ANIMALS SHOWN: 0
8. SERIAL SUBTRACTION OF 7S: 3
WHAT LEVEL OF EDUCATION WAS ATTAINED: 0
7. [VIGILENCE] TAP WHEN HEARING DESIGNATED LETTER: 1
5. MEMORY TRIALS: 0
12. MEMORY INDEX SCORE: 2
VISUOSPATIAL/EXECUTIVE SUBSCORE: 0
10. [FLUENCY] NAME WORDS STARTING WITH DESIGNATED LETTER: 1

## 2024-01-19 NOTE — PROGRESS NOTES
NPV In-Person    Subjective   COVID-19 Infection Date:  December 2020 confirmed (Sx: Fever, Fatigue, Loss or disturbed smell, Headache, Sore throat, Cough, Pain on breathing, Loss or disturbed taste, Muscle pain, Diarrhea, Sinus pressure, Shortness of breath, Brain fog   December 2021 confirmed (Sx: Fever, Shortness of breath, Fatigue, Pain on breathing, Loss or disturbed smell, Runny nose, Muscle pain, Diarrhea, Brain fog, Sore throat, Sinus Pressure   03/24/2023   home antigen test confirmed (sx: Fever, Shortness of breath, Fatigue, Pain on breathing, Chest pain, Loss or disturbed smell, Runny nose, Headache, Muscle pain, Diarrhea, Brain fog, Sinus pressure   - no hospitalization, no treatment)    COVID-19 vaccine status: Pfizer    Occupation: full-time , currently on intermittent FMLA     Current Providers: PCP Dr Melanie Bee    Survey Scores: 01/2024  PHQ-9: 6    MAYTE-7: 12    Sleep Wellness: 9    FSS average: 6.56    Modified Ecog average: 3.67    MOCA: 18/22 (01/2024)  Overall Health: 60     38 y.o. female with a h/o COVID-19 in March 2023, anxiety and depression, presents to establish care at the  COVID Recovery Clinic with c/o Fatigue and PEM, cognitive and memory changes, leg pain, headache, feeling feverish, tinnitus, hoarse voice, dry eyes, SOB with exertion, palpitations, dizziness, rashes, diarrhea, bruises, mood changes    PASC symptoms were noticed most strongly last march, was experiencing exhaustion and confusion then  Prior to that had weakness, numbness tingling, rashes. Was not disruptive prior.  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  Difficulty with attention as well, memory is not great, writes everything down  Fatigue is present all the time, steady with worsening when exacerbated by activity  Sleeping OK, had bad insomnia after first COVID illness, saw neurologist then for carpal tunnel syndrome as well, was recommended melatonin and has been taking since then, if she does not take it then wakes up at 5am  Good sleep hygiene, that is helpful, not refreshed in the morning but able to get up  Not sleepy during the day, no nodding off, more so exhaustion  When she has a day off then can lay down and take a 3 hour nap without a problem, doesn't feel better  Snored as a child, unsure if as adult  Unsure about restless legs  Has had leg pain, feels like muscle spasms in the back of her legs and feet, feels jumpy, numbness and tingling as well but that is not as bad as it used to be, not sitting as much as work right now due to renovation at [email or web address]  Leg pain is intermittent, after activities, a few times per month since scaled back on activities, constant when she was more activity  Sometimes pain in her hands, more so after first round of COVID  Headache is always back of her head, sometimes the whole head, has some kind of headache every week, sometimes last for a few days, unsure how to describe the pain, feel different than headaches she had in the past  Does not feel like sinus of tension headache, feels like hot or how a fever headache would feel  No vision changes  Has popping in her ears, sometimes hears like a ringing or whoosing, lasts for a few seconds only  Notices it every few days, no hearing changes, sounds are bilateral  No sinus complaints  Lost her taste and smell the first time she had COVID, took a few months to come back, now  normal  Getting hoarse voice quickly, when talking typically, no dry mouth but has been noticing dry eyes and itching  SOB with activities, tries to do slow deep breathing to get back to normal, difficult  Also at rest will feel SOB even when just thinking, feels like she climbed a hill at work when at work at times  No SOB when laying down or sleeping  No cough, sometimes a cough when breathing in wrong, not typically  No asthma hx  When she had COVID last spring had chest pain, that resolved  Having palpitations, those were really bad at first, was seen in ED for SOB and heart beating out of chest, EKG showed mild tachycardia, with that reassurance has been ignoring this or breathing through it  In early 20s had panic attacks, this feels different, toosl are not working, not tied to how she is feeling emotionally, sometimes when just sitting on the cough  Palpitations feel fast and hard, no skipping, a few times per week now, last around half hour to 1 hour  Typically can bring them on with activities, takes a while to resolve when laying down  Sometimes wakes up with palpitations  Dizziness is fairly regular, every other day, unsure what brings that on, sometimes when standing up quickly, sometimes just sitting on the couch feels dizzy/vertigo  Feels like she is going to fall down when dizzy, not so much spinning  First time she had COVID in 2020 passed out a few times after taking a shower and walking back to her room, now feels like the beginning of that  Still has some presyncopal episodes after shower but that has improved, has been sitting down in the shower and able to stand more  Sometimes dizzy when just sitting  Unsure if dizzy when laying down, cannot bring dizziness on with looking side to side or up or down  Sometimes ankles with swell, not too often  Small patches of skin rash popping up on her body, itchy, the first time she had COVID had little spots of on her legs that were itchy, resolved after a  month or two  Still getting small red dots on her chest at times, slightly raised, looks like a mosquito bite, itchy  No new allergies that she is aware  Avoids gluten because it gives her GI problems and used to get migraines from that, eats a nutrient dense of anti-inflammatory diet  Stomach has not been feeling great, loud, not so much nausea anymore, getting diarrhea a few times per week  When she has diarrhea, then 4-5 BM per day, watery, cramping  Sometimes heartburn, not often  No urinary complaints  Bruising on the tops of her feet, on her legs and arms in places that she doesn't have a memory of getting injured  Some days has feverish feeling, temperature is normal, used to be all the time, since scaling back activity once every few weeks  Mood has not been great but she has been engaged in things to help her mood, walking to work around 1/2 hour per walk helps, staying in touch with friends, keeps as gratitude journal  Used to do yoga every day but doesn't feel steady enough on her feet to do that  Trying restorative yoga poses and meditate with that  Started Wellbutrin given history of seasonal depression, that does seem to help  Had a lot of anxiety the first few weeks that she was taking it, that subsided  Seeing therapist through [facility]  Worried about her job, on intermittent FMLA right now, when working 40hrs per week then was barely able to get through the day, still working a little less and declining opportunities, still feels like too much  Takes liquid IV that was recommended by co-worker, that makes her feel better, still really tired but not quite as foggy  Also taking multivitamin that has D in it    full ROS completed and all negative unless noted in HPI     Relevant prior healthcare visits:  -11/2023 PCP prescribed wellbutrin, continue follow-up with therapist, referral to [facility] for PASC symptoms  -04/2023 ED for chest pain and SOB, work-up reassuring    Relevant prior diagnostic  "studies:  -none available for review    Relevant prior laboratory values, unremarkable unless noted:  -07/2023 Vitamin D 22, HbA1c, Lipids, Citrulline Ab, RF, ESR, CRP, TSH, CMP, CBC/D  -04/2023 D-Dimer    Exercise routine: 60 minutes 5 days per week  Diet:  Weight hx: pre-COVID-19 105  lbs -> post-COVID-19 100  lbs  Substance use: former tobacco use, 1-2 servings ETOH monthly or less   Social:      Current Outpatient Medications:     buPROPion XL (Wellbutrin XL) 150 mg 24 hr tablet, Take 1 tablet (150 mg) by mouth once daily in the morning. Do not crush, chew, or split., Disp: 30 tablet, Rfl: 1    multivitamin with minerals tablet, Take 1 tablet by mouth once daily., Disp: , Rfl:     History reviewed. No pertinent past medical history.    Past Surgical History:   Procedure Laterality Date    OTHER SURGICAL HISTORY  05/17/2022    No history of surgery       Family History   Problem Relation Name Age of Onset    Fibromyalgia Mother      Multiple sclerosis Mother's Sister          x2    Multiple sclerosis Mother's Brother      Breast cancer Paternal Grandmother  60 - 69       Objective   /76   Pulse 93   Temp 37 °C (98.6 °F)   Ht 1.6 m (5' 3\")   Wt (!) 44.4 kg (97 lb 12.8 oz)   SpO2 100% Comment: Room Air  BMI 17.32 kg/m²     Physical Exam  Vitals reviewed.   Constitutional:       Appearance: Normal appearance.   HENT:      Mouth/Throat:      Comments: Wearing mask    Eyes:      Conjunctiva/sclera: Conjunctivae normal.   Cardiovascular:      Rate and Rhythm: Normal rate and regular rhythm.      Heart sounds: Normal heart sounds.   Pulmonary:      Effort: Pulmonary effort is normal.      Breath sounds: Normal breath sounds.   Abdominal:      General: Bowel sounds are normal.      Palpations: Abdomen is soft.   Musculoskeletal:         General: Normal range of motion.      Cervical back: Neck supple.   Skin:     General: Skin is warm and dry.   Neurological:      General: No focal deficit present. " "  Psychiatric:         Mood and Affect: Mood normal.         Cognition and Memory: Cognition normal.         Assessment/Plan   Problem List Items Addressed This Visit             ICD-10-CM       High    Post-acute sequelae of COVID-19 (PASC) - Primary U09.9     01/2024:  Fatigue and PEM, cognitive and memory changes, leg pain, headache, feeling feverish, tinnitus, hoarse voice, dry eyes, SOB with exertion, palpitations, dizziness, rashes, diarrhea, bruises, mood changes  -comprehensive blood work, please have this drawn in the morning, fasting except for water   -autonomic testing to evaluate for dysautonomia as a cause/contribution to your symptoms  -we will consider further lung and/or heart testing pending lab-work and autonomic testing results  [facility], you will be called to set up this appointment  -use diaphragmatic breathing exercises a few times per day, specifically when you are feeling symptomatic   -increase your water and salt intake, try compression leggings  -additional recommendations provided under \"Patient Education\" section              Relevant Orders    Tryptase    Serum Protein Electrophoresis    Troponin I, High Sensitivity    Vitamin B1, Whole Blood    Vitamin B6    Cortisol AM    DAVID with Reflex to WATSON    C-Reactive Protein    Ferritin    Folate    Hemoglobin A1C    CBC and Auto Differential    D-Dimer, Non VTE    Rheumatoid Factor    Sedimentation Rate    TSH with reflex to Free T4 if abnormal    Iron and TIBC    Citrulline Antibody, IgG    B-Type Natriuretic Peptide    Creatine Kinase    Comprehensive Metabolic Panel    Vitamin B12    Vitamin D 25-Hydroxy,Total (for eval of Vitamin D levels)    Referral to Psychology    Autonomic Testing       Medium    Anxiety and depression F41.9, F32.A    Relevant Orders    Referral to Psychology     Other Visit Diagnoses         Codes    Dizziness     R42    Relevant Orders    Autonomic Testing            "

## 2024-01-25 ENCOUNTER — OFFICE VISIT (OUTPATIENT)
Dept: OTHER | Facility: CLINIC | Age: 39
End: 2024-01-25
Payer: COMMERCIAL

## 2024-01-25 VITALS
HEIGHT: 63 IN | TEMPERATURE: 98.6 F | SYSTOLIC BLOOD PRESSURE: 109 MMHG | WEIGHT: 97.8 LBS | OXYGEN SATURATION: 100 % | DIASTOLIC BLOOD PRESSURE: 76 MMHG | BODY MASS INDEX: 17.33 KG/M2 | HEART RATE: 93 BPM

## 2024-01-25 DIAGNOSIS — U09.9 POST-ACUTE SEQUELAE OF COVID-19 (PASC): Primary | ICD-10-CM

## 2024-01-25 DIAGNOSIS — F41.9 ANXIETY AND DEPRESSION: ICD-10-CM

## 2024-01-25 DIAGNOSIS — R42 DIZZINESS: ICD-10-CM

## 2024-01-25 DIAGNOSIS — F32.A ANXIETY AND DEPRESSION: ICD-10-CM

## 2024-01-25 PROCEDURE — 99215 OFFICE O/P EST HI 40 MIN: CPT | Mod: ZK | Performed by: NURSE PRACTITIONER

## 2024-01-25 PROCEDURE — 1036F TOBACCO NON-USER: CPT | Performed by: NURSE PRACTITIONER

## 2024-01-25 PROCEDURE — 99205 OFFICE O/P NEW HI 60 MIN: CPT | Performed by: NURSE PRACTITIONER

## 2024-01-25 NOTE — PATIENT INSTRUCTIONS
It was my pleasure seeing you in the [facility] Recovery Clinic today.  We will focus on addressing the following symptoms discussed today: Fatigue and PEM, cognitive and memory changes, leg pain, headache, feeling feverish, tinnitus, hoarse voice, dry eyes, SOB with exertion, palpitations, dizziness, rashes, diarrhea, bruises, mood changes    My recommendations are as follows:  -comprehensive blood work, please have this drawn in the morning, fasting except for water   -autonomic testing to evaluate for dysautonomia as a cause/contribution to your symptoms  -we will consider further lung and/or heart testing pending lab-work and autonomic testing results  -referral to [facility] Psychologist Jasmyn Yadav, you will be called to set up this appointment  -use diaphragmatic breathing exercises a few times per day, specifically when you are feeling symptomatic   -increase your water and salt intake, try compression leggings  -try Yoga for POTS, here is a link to further information: [email or web address]     OTC treatment modalities that can help with post-viral gastrointestinal complaints  -Nutritional Roots 42 Billion CFU Probiotics taken at bedtime ([employer] or Amazon)  -Benefiber  -IBgard capsules to be taken   -[employer] azam     Tips to help improve brain fog and fatigue:  --avoid drinking Alcohol while recovering from COVID  --ensure to practice 30 minutes of exercise 7 days per week to keep BNDGF (brain derived neurotrophic growth factor) elevated as this will help in the regeneration of neurons, you may split exercise time up into 5 minute increments if this is better tolerated.   --Focus on eating a whole foods rich in fiber such as vegetables, fruits, beans, nuts, legumes, seeds, whole grains. Avoid processed foods and beverages. Eliminate added sugars, artificial sweeteners, processed oils, artificial dyes.  --slowly increase your activity by no more than 10% per  "week, rest when you feel tired  --utilize pacing techniques to manage fatigue, schedule rest times throughout the day so you do not run out of energy, more information to be found on this here: http://www.phsa.ca/health-info-site/Documents/post_covid-19_fatigue.pdf  --Review the  [employer] Talk on Managing Fatigue and Thinking Changes after COVID-19 here: https://www.hospitals.org/Health-Talks/articles/2022/05/managing-fatigue-and-thinking-changes-after-covid-19  --You can also find many helpful tips and tricks in this book: \"The Long COVID self-help guide, practical ways to manage symptoms\" by The Specialists from the [facility] [place]  --another book you may find helpful: \"Clearing the Fog\" by Dr. Atr Zelaya     Tips to help with tinnitus:  -consider acupuncture treatments through [employer]  -consider using a journal to document triggers such as increased salt intake, caffeine, stress  -reduce exposure to NSAIDs (Ibuprofen, Naproxen, Advil)  -reduce alcohol intake  -avoid artificial sweeteners including aspartame  -use ear protection when exposed to loud noise  -Acupressure for Tinnitus <https://www.youtube.com/watch?v=gR00ai4Tvx0&t=57s>   -Stress Management through mindfulness, yoga  -breath work with Kadeem bailey- humming bee breath- 5 minutes, twice daily  https://www.youLeapsetube.com/watch?v=dHU35f5eKe5&t=88s     General headache recommendations:  -avoid common triggers which include red wine, dark beer, aged cheese, nuts, onions, chocolate, aspartame, processed meats and nitrates, excessive caffeine, caffeine withdrawal, fasting, skipping meals, barometric pressure change, bright light, poor air quality, odors  -avoid overuse of OTC medications such as Ibuprofen, Naproxen, Excedrin, etc. as this can lead to rebound headaches  -maintain a stable and consistent sleep schedule, even on weekends  -stay well-hydrated with non-caffeinated beverages  -avoid skipping r delaying meals  -aim for " 30 minutes of movement per day, find something you enjoy so it doesn't feel like a chore  -Mind-Body and Stress Managements tools include acupuncture, chiropractic care, yoga, meditation, psychotherapy     We will send a message in [email or web address] or call you with the results of your tests.  Further recommendations will follow based on testing results and your symptoms.   Please return to COVID Recovery Clinic in 3 months, call [phone] or send a message through your [email or web address] azam if needed.    Please also consider attending  PICS support group for long-COVID and post-ICU patients. To contact support group, email [email or web address] or call [phone]

## 2024-01-25 NOTE — ASSESSMENT & PLAN NOTE
"01/2024:  Fatigue and PEM, cognitive and memory changes, leg pain, headache, feeling feverish, tinnitus, hoarse voice, dry eyes, SOB with exertion, palpitations, dizziness, rashes, diarrhea, bruises, mood changes  -comprehensive blood work, please have this drawn in the morning, fasting except for water   -autonomic testing to evaluate for dysautonomia as a cause/contribution to your symptoms  -we will consider further lung and/or heart testing pending lab-work and autonomic testing results  -[facility]asm[facility]av, you will be called to set up this appointment  -use diaphragmatic breathing exercises a few times per day, specifically when you are feeling symptomatic   -increase your water and salt intake, try compression leggings  -additional recommendations provided under \"Patient Education\" section       "

## 2024-01-26 DIAGNOSIS — F41.8 MIXED ANXIETY AND DEPRESSIVE DISORDER: ICD-10-CM

## 2024-01-27 RX ORDER — BUPROPION HYDROCHLORIDE 150 MG/1
150 TABLET ORAL EVERY MORNING
Qty: 30 TABLET | Refills: 1 | Status: SHIPPED | OUTPATIENT
Start: 2024-01-27 | End: 2024-02-12

## 2024-01-29 ENCOUNTER — APPOINTMENT (OUTPATIENT)
Dept: OBSTETRICS AND GYNECOLOGY | Facility: HOSPITAL | Age: 39
End: 2024-01-29
Payer: COMMERCIAL

## 2024-02-09 ENCOUNTER — LAB (OUTPATIENT)
Dept: LAB | Facility: HOSPITAL | Age: 39
End: 2024-02-09
Payer: COMMERCIAL

## 2024-02-09 DIAGNOSIS — U09.9 POST-ACUTE SEQUELAE OF COVID-19 (PASC): ICD-10-CM

## 2024-02-09 LAB
25(OH)D3 SERPL-MCNC: 37 NG/ML (ref 30–100)
ALBUMIN SERPL BCP-MCNC: 4.3 G/DL (ref 3.4–5)
ALP SERPL-CCNC: 40 U/L (ref 33–110)
ALT SERPL W P-5'-P-CCNC: 26 U/L (ref 7–45)
ANION GAP SERPL CALC-SCNC: 11 MMOL/L (ref 10–20)
AST SERPL W P-5'-P-CCNC: 23 U/L (ref 9–39)
BASOPHILS # BLD AUTO: 0.04 X10*3/UL (ref 0–0.1)
BASOPHILS NFR BLD AUTO: 0.9 %
BILIRUB SERPL-MCNC: 0.3 MG/DL (ref 0–1.2)
BNP SERPL-MCNC: 14 PG/ML (ref 0–99)
BUN SERPL-MCNC: 10 MG/DL (ref 6–23)
CALCIUM SERPL-MCNC: 8.7 MG/DL (ref 8.6–10.3)
CARDIAC TROPONIN I PNL SERPL HS: <3 NG/L (ref 0–34)
CCP IGG SERPL-ACNC: <1 U/ML
CHLORIDE SERPL-SCNC: 107 MMOL/L (ref 98–107)
CK SERPL-CCNC: 48 U/L (ref 0–215)
CO2 SERPL-SCNC: 26 MMOL/L (ref 21–32)
CORTIS AM PEAK SERPL-MSCNC: 10.9 UG/DL (ref 5–20)
CREAT SERPL-MCNC: 0.61 MG/DL (ref 0.5–1.05)
CRP SERPL-MCNC: <0.1 MG/DL
D DIMER PPP FEU-MCNC: 297 NG/ML FEU
EGFRCR SERPLBLD CKD-EPI 2021: >90 ML/MIN/1.73M*2
EOSINOPHIL # BLD AUTO: 0.08 X10*3/UL (ref 0–0.7)
EOSINOPHIL NFR BLD AUTO: 1.8 %
ERYTHROCYTE [DISTWIDTH] IN BLOOD BY AUTOMATED COUNT: 12.6 % (ref 11.5–14.5)
ERYTHROCYTE [SEDIMENTATION RATE] IN BLOOD BY WESTERGREN METHOD: 1 MM/H (ref 0–20)
EST. AVERAGE GLUCOSE BLD GHB EST-MCNC: 105 MG/DL
FERRITIN SERPL-MCNC: 63 NG/ML (ref 8–150)
FOLATE SERPL-MCNC: 17.1 NG/ML
GLUCOSE SERPL-MCNC: 85 MG/DL (ref 74–99)
HBA1C MFR BLD: 5.3 %
HCT VFR BLD AUTO: 38.4 % (ref 36–46)
HGB BLD-MCNC: 12.6 G/DL (ref 12–16)
IMM GRANULOCYTES # BLD AUTO: 0 X10*3/UL (ref 0–0.7)
IMM GRANULOCYTES NFR BLD AUTO: 0 % (ref 0–0.9)
IRON SATN MFR SERPL: 24 % (ref 25–45)
IRON SERPL-MCNC: 78 UG/DL (ref 35–150)
LYMPHOCYTES # BLD AUTO: 1.76 X10*3/UL (ref 1.2–4.8)
LYMPHOCYTES NFR BLD AUTO: 40.4 %
MCH RBC QN AUTO: 30.7 PG (ref 26–34)
MCHC RBC AUTO-ENTMCNC: 32.8 G/DL (ref 32–36)
MCV RBC AUTO: 93 FL (ref 80–100)
MONOCYTES # BLD AUTO: 0.26 X10*3/UL (ref 0.1–1)
MONOCYTES NFR BLD AUTO: 6 %
NEUTROPHILS # BLD AUTO: 2.22 X10*3/UL (ref 1.2–7.7)
NEUTROPHILS NFR BLD AUTO: 50.9 %
NRBC BLD-RTO: 0 /100 WBCS (ref 0–0)
PLATELET # BLD AUTO: 276 X10*3/UL (ref 150–450)
POTASSIUM SERPL-SCNC: 4.4 MMOL/L (ref 3.5–5.3)
PROT SERPL-MCNC: 6.8 G/DL (ref 6.4–8.2)
PROT SERPL-MCNC: 6.8 G/DL (ref 6.4–8.2)
RBC # BLD AUTO: 4.11 X10*6/UL (ref 4–5.2)
RHEUMATOID FACT SER NEPH-ACNC: <10 IU/ML (ref 0–15)
SODIUM SERPL-SCNC: 140 MMOL/L (ref 136–145)
TIBC SERPL-MCNC: 327 UG/DL (ref 240–445)
TSH SERPL-ACNC: 1.19 MIU/L (ref 0.44–3.98)
UIBC SERPL-MCNC: 249 UG/DL (ref 110–370)
VIT B12 SERPL-MCNC: 453 PG/ML (ref 211–911)
WBC # BLD AUTO: 4.4 X10*3/UL (ref 4.4–11.3)

## 2024-02-09 PROCEDURE — 84165 PROTEIN E-PHORESIS SERUM: CPT

## 2024-02-09 PROCEDURE — 86431 RHEUMATOID FACTOR QUANT: CPT

## 2024-02-09 PROCEDURE — 36415 COLL VENOUS BLD VENIPUNCTURE: CPT

## 2024-02-09 PROCEDURE — 86140 C-REACTIVE PROTEIN: CPT

## 2024-02-09 PROCEDURE — 82746 ASSAY OF FOLIC ACID SERUM: CPT

## 2024-02-09 PROCEDURE — 82306 VITAMIN D 25 HYDROXY: CPT

## 2024-02-09 PROCEDURE — 82728 ASSAY OF FERRITIN: CPT

## 2024-02-09 PROCEDURE — 83036 HEMOGLOBIN GLYCOSYLATED A1C: CPT

## 2024-02-09 PROCEDURE — 82550 ASSAY OF CK (CPK): CPT

## 2024-02-09 PROCEDURE — 82533 TOTAL CORTISOL: CPT

## 2024-02-09 PROCEDURE — 85652 RBC SED RATE AUTOMATED: CPT

## 2024-02-09 PROCEDURE — 83520 IMMUNOASSAY QUANT NOS NONAB: CPT

## 2024-02-09 PROCEDURE — 82607 VITAMIN B-12: CPT

## 2024-02-09 PROCEDURE — 86038 ANTINUCLEAR ANTIBODIES: CPT

## 2024-02-09 PROCEDURE — 84207 ASSAY OF VITAMIN B-6: CPT

## 2024-02-09 PROCEDURE — 84425 ASSAY OF VITAMIN B-1: CPT

## 2024-02-09 PROCEDURE — 83880 ASSAY OF NATRIURETIC PEPTIDE: CPT

## 2024-02-09 PROCEDURE — 80053 COMPREHEN METABOLIC PANEL: CPT

## 2024-02-09 PROCEDURE — 86200 CCP ANTIBODY: CPT

## 2024-02-09 PROCEDURE — 83540 ASSAY OF IRON: CPT

## 2024-02-09 PROCEDURE — 85025 COMPLETE CBC W/AUTO DIFF WBC: CPT

## 2024-02-09 PROCEDURE — 84165 PROTEIN E-PHORESIS SERUM: CPT | Performed by: PATHOLOGY

## 2024-02-09 PROCEDURE — 84155 ASSAY OF PROTEIN SERUM: CPT | Mod: 59

## 2024-02-09 PROCEDURE — 85379 FIBRIN DEGRADATION QUANT: CPT

## 2024-02-09 PROCEDURE — 84443 ASSAY THYROID STIM HORMONE: CPT

## 2024-02-09 PROCEDURE — 84484 ASSAY OF TROPONIN QUANT: CPT

## 2024-02-11 DIAGNOSIS — F41.8 MIXED ANXIETY AND DEPRESSIVE DISORDER: ICD-10-CM

## 2024-02-11 LAB — TRYPTASE SERPL-MCNC: 5.6 UG/L

## 2024-02-12 LAB — ANA SER QL HEP2 SUBST: NEGATIVE

## 2024-02-12 RX ORDER — BUPROPION HYDROCHLORIDE 150 MG/1
150 TABLET ORAL EVERY MORNING
Qty: 90 TABLET | Refills: 0 | Status: SHIPPED | OUTPATIENT
Start: 2024-02-12 | End: 2024-05-10 | Stop reason: WASHOUT

## 2024-02-13 LAB
ALBUMIN: 4.4 G/DL (ref 3.4–5)
ALPHA 1 GLOBULIN: 0.3 G/DL (ref 0.2–0.6)
ALPHA 2 GLOBULIN: 0.6 G/DL (ref 0.4–1.1)
BETA GLOBULIN: 0.6 G/DL (ref 0.5–1.2)
GAMMA GLOBULIN: 0.9 G/DL (ref 0.5–1.4)
PATH REVIEW-SERUM PROTEIN ELECTROPHORESIS: NORMAL
PROTEIN ELECTROPHORESIS COMMENT: NORMAL

## 2024-02-14 LAB — VIT B1 PYROPHOSHATE BLD-SCNC: 106 NMOL/L (ref 70–180)

## 2024-02-22 ENCOUNTER — HOSPITAL ENCOUNTER (OUTPATIENT)
Dept: NEUROLOGY | Facility: HOSPITAL | Age: 39
Discharge: HOME | End: 2024-02-22
Payer: COMMERCIAL

## 2024-02-22 DIAGNOSIS — U09.9 POST-ACUTE SEQUELAE OF COVID-19 (PASC): ICD-10-CM

## 2024-02-22 DIAGNOSIS — R42 DIZZINESS: ICD-10-CM

## 2024-02-22 PROCEDURE — 95924 ANS PARASYMP & SYMP W/TILT: CPT | Performed by: PSYCHIATRY & NEUROLOGY

## 2024-02-22 PROCEDURE — 95923 AUTONOMIC NRV SYST FUNJ TEST: CPT | Performed by: PSYCHIATRY & NEUROLOGY

## 2024-02-23 DIAGNOSIS — R42 DIZZINESS: Primary | ICD-10-CM

## 2024-02-27 NOTE — ASSESSMENT & PLAN NOTE
"03/2024:  Fatigue and PEM, cognitive and memory changes, leg pain, headache, tinnitus, hoarse voice, dry eyes, SOB with exertion, palpitations, dizziness, rashes, diarrhea, bruises, mood changes  -start Vitamin B12 supplement, you can add this to your multivitamin  -to further evaluate your heart, I ordered an echocardiogram and cardiac event monitor  -to further evaluate your lungs, I ordered a chest x-ray and pulmonary function testing  -please call back Jay Jay from Vascular Therapies office to schedule your dizziness evaluation with her  -referral to headache neurology  -referral to Tessie Layne for Physical Therapy at [facility] for dizziness, leg pain, fatigue and post-exertional malaise  -referral to Liz Jenkins for Speech therapy at [facility] for cognitive/memory complaints and hoarse voice  -additional recommendations provided under \"Patient Education\" section     01/2024:  Fatigue and PEM, cognitive and memory changes, leg pain, headache, feeling feverish, tinnitus, hoarse voice, dry eyes, SOB with exertion, palpitations, dizziness, rashes, diarrhea, bruises, mood changes  -comprehensive blood work, please have this drawn in the morning, fasting except for water   -autonomic testing to evaluate for dysautonomia as a cause/contribution to your symptoms  -we will consider further lung and/or heart testing pending lab-work and autonomic testing results  -referral to [facility] Psychologist Jasmyn Yadav, you will be called to set up this appointment  -use diaphragmatic breathing exercises a few times per day, specifically when you are feeling symptomatic   -increase your water and salt intake, try compression leggings  -additional recommendations provided under \"Patient Education\" section   -> [employer]    "

## 2024-02-27 NOTE — PROGRESS NOTES
[facility] Virtual The virtual visit conducted with Audio and Video.    Verbal consent was given for the following virtual visit, patient is currently located in [place]. All issues discussed and addressed below were done so without a physical examination. If it was felt the patient needed be seen in clinic in person they were directed there.     Subjective   COVID-19 Infection Date:  December 2020 confirmed (Sx: Fever, Fatigue, Loss or disturbed smell, Headache, Sore throat, Cough, Pain on breathing, Loss or disturbed taste, Muscle pain, Diarrhea, Sinus pressure, Shortness of breath, Brain fog   December 2021 confirmed (Sx: Fever, Shortness of breath, Fatigue, Pain on breathing, Loss or disturbed smell, Runny nose, Muscle pain, Diarrhea, Brain fog, Sore throat, Sinus Pressure   03/24/2023   home antigen test confirmed (sx: Fever, Shortness of breath, Fatigue, Pain on breathing, Chest pain, Loss or disturbed smell, Runny nose, Headache, Muscle pain, Diarrhea, Brain fog, Sinus pressure   - no hospitalization, no treatment)    COVID-19 vaccine status: Pfizer    Occupation: full-time , currently on intermittent FMLA     Current Providers: PCP Dr Melanie Bee    Survey Scores: 01/2024  PHQ-9: 6    MAYTE-7: 12    Sleep Wellness: 9    FSS average: 6.56    Modified Ecog average: 3.67    MOCA: 18/22 (01/2024)  Overall Health: 60     38 y.o. female with a h/o COVID-19 in March 2023, anxiety and depression, presents for follow-up at the  [facility] with c/o Fatigue and PEM, cognitive and memory changes, leg pain, headache, tinnitus, hoarse voice, dry eyes, SOB with exertion, palpitations, dizziness, rashes, diarrhea, bruises, mood changes    Autonomic testing was normal, although was quite symptomatic during the test  Very uncomfortable, dizziness/room spinning, tingling in hands, intense desire to pass out because she felt presyncopal  Noticing dizziness with any exertion, getting range of symptoms with  any exertion  To avoid this has been taking it very easy  When carrying the laundry up the stairs, an hour later sitting down feels very dizzy  Sometimes eating certain foods feels very dizzy  Also extreme tiredness, often the next day pain  Working part-time right now, coming up to the end of FMLA and paid sick time, has to make a decision on going part-time officially which would cause loss of health insurance  Afraid to go back to regular activity and wiping herself out, cannot do yoga for 15 minutes and mopping floors  Feels like breath is not working when she is exerting herself, doing deep breathing and has been doing breath work, cannot get air in, no cough although she noticed that when she has a meeting at work or hour-long phone call her voice gets hoarse  Feels more short of breath when her voice gets hoarse as well  Has not noted any irritants contributing to shortness of breath, wearing N95 mask outside of the house  No chest pain since immediately after COVID illness in March 2023  Palpitations, feels that HR is going too fast and too hard, typically when up and about, sometimes when she takes a nap wakes up feeling this way as well  Headaches are on the back of her head, present when she wakes up, sometimes gets a little bit better throughout the day, different than her typical tension headaches in the front of her head, back of the head feels hot and throbbing  Feels like a migraine  Neck pain as well, leg pain after exertion, leg pain feels like bone ache or shin splints, sometimes gets radiating hip pain  Has not felt feverish since last visit  Dry eyes are ongoing  Rashes are still present, has been taking Epsom salt baths and that seems to help, helps with pain as well  Cannot stay in the water very long because she feels as though she is going to pass out  Diarrhea is here and there, comes and goes, feels more related to amount of activity or stress that she has  Mood has been OK, wants to talk  to the Psychologist who specializes in LC, on wait list  Working on gratitude journal and doing meditation, has decent tool kit for that  Has been struggling with cognition and memory, feels slow, writing everything down, still feels out of it all the time  Sleeping well, 7 hours per night, does not feel refreshed during the day, searching for words  Not nodding off during the day  Has not started B12 supplement yet  Mother notes that she has MTHFR  Wants to look into all possibilities for what is happening with her body, wants to rule things out  Hx of MS on her mom's side of family, mother has fibromyalgia    Relevant prior healthcare visits:  -11/2023 PCP prescribed wellbutrin, continue follow-up with therapist, referral to [facility] for PASC symptoms  -04/2023 ED for chest pain and SOB, work-up reassuring    Relevant prior diagnostic studies:  -02/2024 autonomic testing normal    Relevant prior laboratory values, unremarkable unless noted:  -02/2024 Vitamin D, Vitamin B12 453, CMP, CK, BNP, Citrulline Ab, iron studies, TSH, ESR, RF, D-Dimer, CBC/D, HbA1c, Folate, Ferritin, CRP, DAVID, am cortisol, Vitamin B1, Troponin, SPEP, Tryptase   -07/2023 Vitamin D 22, HbA1c, Lipids, Citrulline Ab, RF, ESR, CRP, TSH, CMP, CBC/D  -04/2023 D-Dimer    Exercise routine: 60 minutes 5 days per week  Diet:  Weight hx: pre-COVID-19 105  lbs -> post-COVID-19 100  lbs  Substance use: former tobacco use, 1-2 servings ETOH monthly or less   Social:      Current Outpatient Medications:     buPROPion XL (Wellbutrin XL) 150 mg 24 hr tablet, TAKE 1 TABLET (150 MG) BY MOUTH ONCE DAILY IN THE MORNING. DO NOT CRUSH, CHEW, OR SPLIT., Disp: 90 tablet, Rfl: 0    multivitamin with minerals tablet, Take 1 tablet by mouth once daily., Disp: , Rfl:     History reviewed. No pertinent past medical history.    Past Surgical History:   Procedure Laterality Date    OTHER SURGICAL HISTORY  05/17/2022    No history of surgery       Family History   Problem  "Relation Name Age of Onset    Fibromyalgia Mother      Multiple sclerosis Mother's Sister          x2    Multiple sclerosis Mother's Brother      Breast cancer Paternal Grandmother  60 - 69       Objective   There were no vitals taken for this visit.    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     03/2024:  Fatigue and PEM, cognitive and memory changes, leg pain, headache, tinnitus, hoarse voice, dry eyes, SOB with exertion, palpitations, dizziness, rashes, diarrhea, bruises, mood changes  -start Vitamin B12 supplement, you can add this to your multivitamin  -to further evaluate your heart, I ordered an echocardiogram and cardiac event monitor  -to further evaluate your lungs, I ordered a chest x-ray and pulmonary function testing  -please call back Jay Jay from [facility] office to schedule your dizziness evaluation with her  -referral to headache neurology  -referral to Tessie Layne for Physical Therapy at [facility] for dizziness, leg pain, fatigue and post-exertional malaise  -referral to Lzi Jenkins for Speech therapy at [facility] for cognitive/memory complaints and hoarse voice  -additional recommendations provided under \"Patient Education\" section     01/2024:  Fatigue and PEM, cognitive and memory changes, leg pain, headache, feeling feverish, tinnitus, hoarse voice, dry eyes, SOB with exertion, palpitations, dizziness, rashes, diarrhea, bruises, mood changes  -comprehensive blood work, please have this drawn in the morning, fasting except for water   -autonomic testing to evaluate for dysautonomia as a cause/contribution to your symptoms  -we will consider further lung and/or heart testing pending " "lab-work and autonomic testing results  -referral to [facility] Psychologist Jasmyn Yadav, you will be called to set up this appointment  -use diaphragmatic breathing exercises a few times per day, specifically when you are feeling symptomatic   -increase your water and salt intake, try compression leggings  -additional recommendations provided under \"Patient Education\" section   -> B12 Liz Means           Relevant Orders    XR chest 2 views    Transthoracic Echo Complete    Holter Or Event Cardiac Monitor    Complete Pulmonary Function Test Pre/Post Bronchodialator (Spirometry Pre/Post/DLCO/Lung Volumes)    Pulmonary Stress Test (6 Min. Walk)    Referral to Physical Therapy    Referral to Speech Therapy    Referral to Neurology       Medium    Fatigue R53.83    Relevant Orders    Referral to Physical Therapy     Other Visit Diagnoses         Codes    Dyspnea, unspecified type     R06.00    Relevant Orders    XR chest 2 views    Transthoracic Echo Complete    Holter Or Event Cardiac Monitor    Complete Pulmonary Function Test Pre/Post Bronchodialator (Spirometry Pre/Post/DLCO/Lung Volumes)    Pulmonary Stress Test (6 Min. Walk)    Referral to Physical Therapy    Referral to Speech Therapy    Palpitations     R00.2    Relevant Orders    Holter Or Event Cardiac Monitor    Referral to Physical Therapy    Other chronic pain     G89.29    Relevant Orders    Referral to Physical Therapy    Chronic nonintractable headache, unspecified headache type     R51.9, G89.29    Relevant Orders    Referral to Neurology    Cognitive complaints     R41.9    Relevant Orders    Referral to Speech Therapy    Voice hoarseness     R49.0    Relevant Orders    Referral to Speech Therapy          "

## 2024-03-01 ENCOUNTER — TELEMEDICINE (OUTPATIENT)
Dept: OTHER | Facility: CLINIC | Age: 39
End: 2024-03-01
Payer: COMMERCIAL

## 2024-03-01 DIAGNOSIS — R49.0 VOICE HOARSENESS: ICD-10-CM

## 2024-03-01 DIAGNOSIS — G93.39 OTHER POST INFECTION AND RELATED FATIGUE SYNDROMES: ICD-10-CM

## 2024-03-01 DIAGNOSIS — R51.9 CHRONIC NONINTRACTABLE HEADACHE, UNSPECIFIED HEADACHE TYPE: ICD-10-CM

## 2024-03-01 DIAGNOSIS — R06.00 DYSPNEA, UNSPECIFIED TYPE: ICD-10-CM

## 2024-03-01 DIAGNOSIS — R41.9 COGNITIVE COMPLAINTS: ICD-10-CM

## 2024-03-01 DIAGNOSIS — G89.29 OTHER CHRONIC PAIN: ICD-10-CM

## 2024-03-01 DIAGNOSIS — G89.29 CHRONIC NONINTRACTABLE HEADACHE, UNSPECIFIED HEADACHE TYPE: ICD-10-CM

## 2024-03-01 DIAGNOSIS — U09.9 POST-ACUTE SEQUELAE OF COVID-19 (PASC): Primary | ICD-10-CM

## 2024-03-01 DIAGNOSIS — R00.2 PALPITATIONS: ICD-10-CM

## 2024-03-01 PROCEDURE — 99215 OFFICE O/P EST HI 40 MIN: CPT | Performed by: NURSE PRACTITIONER

## 2024-03-01 PROCEDURE — 1036F TOBACCO NON-USER: CPT | Performed by: NURSE PRACTITIONER

## 2024-03-01 PROCEDURE — 99215 OFFICE O/P EST HI 40 MIN: CPT | Mod: ZK,95 | Performed by: NURSE PRACTITIONER

## 2024-03-01 NOTE — PATIENT INSTRUCTIONS
It was my pleasure seeing you in the COVID Recovery Clinic today.  We will focus on addressing the following symptoms discussed today: Fatigue and PEM, cognitive and memory changes, leg pain, headache, tinnitus, hoarse voice, dry eyes, SOB with exertion, palpitations, dizziness, rashes, diarrhea, bruises, mood changes    My recommendations are as follows:  -start Vitamin B12 supplement, you can add this to your multivitamin  -to further evaluate your heart, I ordered an echocardiogram and cardiac event monitor  -to further evaluate your lungs, I ordered a chest x-ray and pulmonary function testing  -please call back Jay Jay from [facility] office to schedule your dizziness evaluation with her  -referral to headache neurology  -referral to Tessie Layne for Physical Therapy at [facility] for dizziness, leg pain, fatigue and post-exertional malaise  -referral to Liz Jenkins for Speech therapy at [facility] for cognitive/memory complaints and hoarse voice    OTC treatment modalities that can help with post-viral gastrointestinal complaints  -Nutritional Roots 42 Billion CFU Probiotics taken at bedtime ([employer] or [employer])  -Benefiber  -IBgard capsules to be taken   -[employer] azam     Tips to help improve brain fog and fatigue:  --avoid drinking Alcohol while recovering from COVID  --ensure to practice 30 minutes of exercise 7 days per week to keep BNDGF (brain derived neurotrophic growth factor) elevated as this will help in the regeneration of neurons, you may split exercise time up into 5 minute increments if this is better tolerated.   --Focus on eating a whole foods rich in fiber such as vegetables, fruits, beans, nuts, legumes, seeds, whole grains. Avoid processed foods and beverages. Eliminate added sugars, artificial sweeteners, processed oils, artificial dyes.  --slowly increase your activity by no more than 10% per week, rest when you feel tired  --utilize pacing techniques to manage  "fatigue, schedule rest times throughout the day so you do not run out of energy, more information to be found on this here: http://www.phsa.ca/health-info-site/Documents/post_covid-19_fatigue.pdf  --Review the  Health Talk on Managing Fatigue and Thinking Changes after COVID-19 here: https://www.hospitals.org/Health-Talks/articles/2022/05/managing-fatigue-and-thinking-changes-after-covid-19  --You can also find many helpful tips and tricks in this book: \"The Long COVID self-help guide, practical ways to manage symptoms\" by The Specialists from the [facility] [place]  --another book you may find helpful: \"Clearing the Fog\" by Dr. Art Zelaya     General headache recommendations:  -avoid common triggers which include red wine, dark beer, aged cheese, nuts, onions, chocolate, aspartame, processed meats and nitrates, excessive caffeine, caffeine withdrawal, fasting, skipping meals, barometric pressure change, bright light, poor air quality, odors  -avoid overuse of OTC medications such as Ibuprofen, Naproxen, Excedrin, etc. as this can lead to rebound headaches  -maintain a stable and consistent sleep schedule, even on weekends  -stay well-hydrated with non-caffeinated beverages  -avoid skipping r delaying meals  -aim for 30 minutes of movement per day, find something you enjoy so it doesn't feel like a chore  -Mind-Body and Stress Managements tools include acupuncture, chiropractic care, yoga, meditation, psychotherapy     Tips to help with tinnitus:  -consider acupuncture treatments through [employer]  -consider using a journal to document triggers such as increased salt intake, caffeine, stress  -reduce exposure to NSAIDs (Ibuprofen, Naproxen, Advil)  -reduce alcohol intake  -avoid artificial sweeteners including aspartame  -use ear protection when exposed to loud noise  -Acupressure for Tinnitus <https://www.youStyle on Screen.com/watch?v=aK22hd0Vhe6&t=57s>   -Stress Management through mindfulness, " yoga  -breath work with Kadeem bailey- humming bee breath- 5 minutes, twice daily  https://www.youtube.com/watch?v=zOL68v2rLg9&t=88s     We will send a message in [employer] or call you with the results of your tests.  Further recommendations will follow based on testing results and your symptoms.   Please return to COVID Recovery Clinic in 2 months, call [phone] or send a message through your [employer] azam if needed.    Please also consider attending  PICS support group for long-COVID and post-ICU patients. To contact support group, email [email or web address] or call [phone]

## 2024-03-04 ENCOUNTER — APPOINTMENT (OUTPATIENT)
Dept: DERMATOLOGY | Facility: CLINIC | Age: 39
End: 2024-03-04
Payer: COMMERCIAL

## 2024-03-12 PROBLEM — R19.7 DIARRHEA OF PRESUMED INFECTIOUS ORIGIN: Status: RESOLVED | Noted: 2018-03-22 | Resolved: 2024-03-12

## 2024-03-12 PROBLEM — R53.83 MALAISE AND FATIGUE: Status: RESOLVED | Noted: 2018-03-22 | Resolved: 2024-03-12

## 2024-03-12 PROBLEM — R43.2 LOSS OF TASTE: Status: RESOLVED | Noted: 2021-02-04 | Resolved: 2024-03-12

## 2024-03-12 PROBLEM — R09.89 CHRONIC SINUS COMPLAINTS: Status: RESOLVED | Noted: 2018-03-22 | Resolved: 2024-03-12

## 2024-03-12 PROBLEM — R20.2 PARESTHESIA OF HAND, BILATERAL: Status: RESOLVED | Noted: 2021-02-04 | Resolved: 2024-03-12

## 2024-03-12 PROBLEM — R53.81 MALAISE AND FATIGUE: Status: RESOLVED | Noted: 2018-03-22 | Resolved: 2024-03-12

## 2024-03-12 PROBLEM — R53.83 FATIGUE: Status: RESOLVED | Noted: 2024-01-12 | Resolved: 2024-03-12

## 2024-03-12 PROBLEM — R43.0 LOSS OF SENSE OF SMELL: Status: RESOLVED | Noted: 2021-02-04 | Resolved: 2024-03-12

## 2024-03-12 PROBLEM — R20.2 PARESTHESIA OF BOTH FEET: Status: RESOLVED | Noted: 2021-02-04 | Resolved: 2024-03-12

## 2024-03-12 NOTE — PROGRESS NOTES
Subjective   Rosa Andrew is a 38 y.o. female who is here for an annual gyn exam.   Concerns: Patient reports having some white/yellow discharge and a little burning before her period. Denies itching/odor. This doesn't happen before each period, but has been going on irregularly for the last few months. Patient denies any symptoms currently.    She also states that before her period she has some vulvar skin sensitives. Nothing now.    Patient declines talking about birth control today.     Patient's last menstrual period was 2024 (exact date).  Periods are regular every 28-30 days, lasting 3 days normally, though last period was 7 days.   Dysmenorrhea:mild, occurring first 1-2 days of flow.     Last pap: 22 WNL HPV neg; patient was told given LEEP she needed pap yearly   History of abnormal Pap smear: yes - per patient hx of LEEP in   STI testing: yes - all   HPV vaccination: No , declines #1 today     SoHx:  Vaginal hygiene: soap and water  Loss of sexual desire: No  Pain with sex: No  Able to orgasm: Yes   Living Situation: with partner  Safe at home: Yes   Hx of DV: No   School/Employment:    Exercise: Yes , as much as possible with long COVID    Substance:   Tobacco Use: Medium Risk (3/13/2024)    Patient History     Smoking Tobacco Use: Former     Smokeless Tobacco Use: Never     Passive Exposure: Not on file      Social History     Substance and Sexual Activity   Drug Use Yes    Types: Marijuana      Social History     Substance and Sexual Activity   Alcohol Use Not Currently       Social History     Substance and Sexual Activity   Sexual Activity Yes    Partners: Female     OB History          0    Para   0    Term   0       0    AB   0    Living   0         SAB   0    IAB   0    Ectopic   0    Multiple   0    Live Births   0               Past Medical History:   Diagnosis Date    Abnormal Pap smear of cervix     Long COVID      Past Surgical History:   Procedure  "Laterality Date    CERVICAL BIOPSY  W/ LOOP ELECTRODE EXCISION  2015    per patient     Family History   Problem Relation Name Age of Onset    Fibromyalgia Mother      Multiple sclerosis Mother's Sister          x2    Ovarian cancer Mother's Sister      Multiple sclerosis Mother's Brother      Breast cancer Paternal Grandmother  60 - 69    Colon cancer Neg Hx      Pancreatic cancer Neg Hx         Review of Systems   Genitourinary:  Positive for vaginal discharge.   All other systems reviewed and are negative.      Objective   /75   Pulse 73   Ht 1.6 m (5' 3\")   Wt 47.7 kg (105 lb 3.2 oz)   LMP 03/01/2024 (Exact Date)   BMI 18.64 kg/m²     Physical Exam  Constitutional:       Appearance: Normal appearance.   Cardiovascular:      Rate and Rhythm: Normal rate and regular rhythm.      Heart sounds: Normal heart sounds.   Pulmonary:      Effort: Pulmonary effort is normal.      Breath sounds: Normal breath sounds.   Genitourinary:     General: Normal vulva.      Vagina: Normal.      Cervix: Friability present.   Skin:     General: Skin is warm and dry.   Neurological:      Mental Status: She is alert.   Psychiatric:         Mood and Affect: Mood normal.         Behavior: Behavior normal.         Thought Content: Thought content normal.         Judgment: Judgment normal.         Assessment/Plan   Problem List Items Addressed This Visit       Visit for routine gyn exam    Overview     Pap collected  STI testing ordered  Declines HPV vaccination  Declines birth control          Vaginal discharge    Overview     -Patient reports having some white/yellow discharge and a little burning before her period. Denies itching/odor  -This doesn't happen before each period, but has been going on irregularly for the last few months  -Patient denies any symptoms currently  -encouraged patient to RTC when symptomatic for BV/yeast swab           Other Visit Diagnoses       Routine screening for STI (sexually transmitted " infection)    -  Primary    Relevant Orders    Hepatitis B Surface Antigen    Hepatitis C Antibody    HIV 1/2 Antigen/Antibody Screen with Reflex to Confirmation    Syphilis Screen with Reflex    Screening for malignant neoplasm of cervix        Relevant Orders    THINPREP PAP            Plan:   -discussed about breast self awareness  -reviewed mammogram starting at 40 years old unless otherwise clinically indicated   -encouraged healthy eating, exercise recommendations based of off  min of moderate intensity exercise a week  -RTC in 1  year for next annual or prn    ASUNCION Lorenz-STEVIE

## 2024-03-13 ENCOUNTER — OFFICE VISIT (OUTPATIENT)
Dept: OBSTETRICS AND GYNECOLOGY | Facility: CLINIC | Age: 39
End: 2024-03-13
Payer: COMMERCIAL

## 2024-03-13 VITALS
BODY MASS INDEX: 18.64 KG/M2 | HEIGHT: 63 IN | DIASTOLIC BLOOD PRESSURE: 75 MMHG | SYSTOLIC BLOOD PRESSURE: 109 MMHG | WEIGHT: 105.2 LBS | HEART RATE: 73 BPM

## 2024-03-13 DIAGNOSIS — Z12.4 SCREENING FOR MALIGNANT NEOPLASM OF CERVIX: ICD-10-CM

## 2024-03-13 DIAGNOSIS — N89.8 VAGINAL DISCHARGE: ICD-10-CM

## 2024-03-13 DIAGNOSIS — Z01.419 ENCOUNTER FOR GYNECOLOGICAL EXAMINATION WITHOUT ABNORMAL FINDING: ICD-10-CM

## 2024-03-13 DIAGNOSIS — Z11.3 ROUTINE SCREENING FOR STI (SEXUALLY TRANSMITTED INFECTION): Primary | ICD-10-CM

## 2024-03-13 PROCEDURE — 99385 PREV VISIT NEW AGE 18-39: CPT | Performed by: ADVANCED PRACTICE MIDWIFE

## 2024-03-13 PROCEDURE — 87800 DETECT AGNT MULT DNA DIREC: CPT | Performed by: ADVANCED PRACTICE MIDWIFE

## 2024-03-13 PROCEDURE — 1036F TOBACCO NON-USER: CPT | Performed by: ADVANCED PRACTICE MIDWIFE

## 2024-03-13 PROCEDURE — 87624 HPV HI-RISK TYP POOLED RSLT: CPT | Mod: 59 | Performed by: ADVANCED PRACTICE MIDWIFE

## 2024-03-13 PROCEDURE — 87661 TRICHOMONAS VAGINALIS AMPLIF: CPT | Mod: 59 | Performed by: ADVANCED PRACTICE MIDWIFE

## 2024-03-13 PROCEDURE — 88175 CYTOPATH C/V AUTO FLUID REDO: CPT | Mod: TC,GCY | Performed by: ADVANCED PRACTICE MIDWIFE

## 2024-03-13 ASSESSMENT — PAIN SCALES - GENERAL: PAINLEVEL: 0-NO PAIN

## 2024-03-15 LAB
C TRACH RRNA SPEC QL NAA+PROBE: NEGATIVE
N GONORRHOEA DNA SPEC QL PROBE+SIG AMP: NEGATIVE
T VAGINALIS RRNA SPEC QL NAA+PROBE: NEGATIVE

## 2024-03-19 ENCOUNTER — APPOINTMENT (OUTPATIENT)
Dept: PRIMARY CARE | Facility: CLINIC | Age: 39
End: 2024-03-19
Payer: COMMERCIAL

## 2024-03-19 NOTE — PROGRESS NOTES
Subjective   Patient ID: Rosa Andrew is a 38 y.o. female who presents for No chief complaint on file..  HPI  38-year-old female here for follow-up visit, last seen in November.  2/24: doing better on wellbutrin via message     PMHx:  - Anxiety/MDD and insomnia and severe anhedonia-she has tried zoloft, prozac, mirtazapine, remote xanx in the past All of them resulted in more flatness as opposed to being functional.  Wellbutrin was prescribed at last visit.  - Old neck injury  -Lipoma of the face referred to surgery for evaluation  -Long COVID-referred to COVID longsiria clinic-she was seen in January ordered comprehensive blood work, autonomic testing referred to long COVID psychologist recommended B12 referred to headache neurology, physical therapy speech therapy for memory complaints     Social:   - Former smoker - quit 2 months ago, 1-2 cigs/day, in mid to late 20s smoked 1/2 PPD x 5 years   - Former mild alcohol use, now discontinued - 1 drink would result in horrible hangover or headache   - Intermittent marijuana   -    - Lives at home with roommate, going through a divorce ( for a few years)     Current Outpatient Medications   Medication Instructions    buPROPion XL (WELLBUTRIN XL) 150 mg, oral, Every morning, Do not crush, chew, or split.    multivitamin with minerals tablet 1 tablet, oral, Daily        Objective     LMP 03/01/2024 (Exact Date)     Physical Exam    Assessment/Plan   Problem List Items Addressed This Visit    None    Health Maintenance  Cancer screening:   - Mammogram at 42   - Pap 3/24 pending results  Immunizations:

## 2024-03-25 ENCOUNTER — HOSPITAL ENCOUNTER (OUTPATIENT)
Dept: RESPIRATORY THERAPY | Facility: HOSPITAL | Age: 39
Discharge: HOME | End: 2024-03-25
Payer: COMMERCIAL

## 2024-03-25 DIAGNOSIS — U09.9 POST-ACUTE SEQUELAE OF COVID-19 (PASC): ICD-10-CM

## 2024-03-25 DIAGNOSIS — R06.00 DYSPNEA, UNSPECIFIED TYPE: ICD-10-CM

## 2024-03-25 PROCEDURE — 94618 PULMONARY STRESS TESTING: CPT | Performed by: INTERNAL MEDICINE

## 2024-03-25 PROCEDURE — 94726 PLETHYSMOGRAPHY LUNG VOLUMES: CPT | Performed by: INTERNAL MEDICINE

## 2024-03-25 PROCEDURE — 94060 EVALUATION OF WHEEZING: CPT | Performed by: INTERNAL MEDICINE

## 2024-03-25 PROCEDURE — 94726 PLETHYSMOGRAPHY LUNG VOLUMES: CPT

## 2024-03-25 PROCEDURE — 94729 DIFFUSING CAPACITY: CPT | Performed by: INTERNAL MEDICINE

## 2024-03-27 LAB
MGC ASCENT PFT - FEV1 - POST: 3.4
MGC ASCENT PFT - FEV1 - PRE: 3.2
MGC ASCENT PFT - FEV1 - PREDICTED: 2.82
MGC ASCENT PFT - FVC - POST: 4.07
MGC ASCENT PFT - FVC - PRE: 4.06
MGC ASCENT PFT - FVC - PREDICTED: 3.35

## 2024-03-28 LAB
CYTOLOGY CMNT CVX/VAG CYTO-IMP: NORMAL
HPV HR 12 DNA GENITAL QL NAA+PROBE: NEGATIVE
HPV HR GENOTYPES PNL CVX NAA+PROBE: NEGATIVE
HPV16 DNA SPEC QL NAA+PROBE: NEGATIVE
HPV18 DNA SPEC QL NAA+PROBE: NEGATIVE
LAB AP HPV GENOTYPE QUESTION: YES
LAB AP HPV HR: NORMAL
LAB AP PAP ADDITIONAL TESTS: NORMAL
LAB AP TREATMENT HISTORY: NORMAL
LABORATORY COMMENT REPORT: NORMAL
LMP START DATE: NORMAL
PATH REPORT.TOTAL CANCER: NORMAL

## 2024-04-01 ENCOUNTER — HOSPITAL ENCOUNTER (OUTPATIENT)
Dept: CARDIOLOGY | Facility: HOSPITAL | Age: 39
Discharge: HOME | End: 2024-04-01
Payer: COMMERCIAL

## 2024-04-01 DIAGNOSIS — R06.00 DYSPNEA, UNSPECIFIED TYPE: Primary | ICD-10-CM

## 2024-04-01 DIAGNOSIS — R06.00 DYSPNEA, UNSPECIFIED TYPE: ICD-10-CM

## 2024-04-01 DIAGNOSIS — U09.9 POST-ACUTE SEQUELAE OF COVID-19 (PASC): ICD-10-CM

## 2024-04-01 LAB
AORTIC VALVE PEAK VELOCITY: 1.52 M/S
AV PEAK GRADIENT: 9.2 MMHG
AVA (PEAK VEL): 1.78 CM2
EJECTION FRACTION APICAL 4 CHAMBER: 65.1
LEFT ATRIUM VOLUME AREA LENGTH INDEX BSA: 32.2 ML/M2
LEFT VENTRICLE INTERNAL DIMENSION DIASTOLE: 4.7 CM (ref 3.5–6)
LEFT VENTRICULAR OUTFLOW TRACT DIAMETER: 2 CM
LV EJECTION FRACTION BIPLANE: 70 %
MITRAL VALVE E/A RATIO: 1.59
MITRAL VALVE E/E' RATIO: 6.35
RIGHT VENTRICLE FREE WALL PEAK S': 12.4 CM/S
TRICUSPID ANNULAR PLANE SYSTOLIC EXCURSION: 2.6 CM

## 2024-04-01 PROCEDURE — 2500000004 HC RX 250 GENERAL PHARMACY W/ HCPCS (ALT 636 FOR OP/ED): Performed by: STUDENT IN AN ORGANIZED HEALTH CARE EDUCATION/TRAINING PROGRAM

## 2024-04-01 PROCEDURE — 93306 TTE W/DOPPLER COMPLETE: CPT | Performed by: STUDENT IN AN ORGANIZED HEALTH CARE EDUCATION/TRAINING PROGRAM

## 2024-04-01 PROCEDURE — 93306 TTE W/DOPPLER COMPLETE: CPT

## 2024-04-01 RX ADMIN — PERFLUTREN 1.5 ML OF DILUTION: 6.52 INJECTION, SUSPENSION INTRAVENOUS at 12:05

## 2024-04-23 NOTE — PROGRESS NOTES
{JTVisit:08309}    Subjective   COVID-19 Infection Date:  December 2020 confirmed (Sx: Fever, Fatigue, Loss or disturbed smell, Headache, Sore throat, Cough, Pain on breathing, Loss or disturbed taste, Muscle pain, Diarrhea, Sinus pressure, Shortness of breath, Brain fog   December 2021 confirmed (Sx: Fever, Shortness of breath, Fatigue, Pain on breathing, Loss or disturbed smell, Runny nose, Muscle pain, Diarrhea, Brain fog, Sore throat, Sinus Pressure   03/24/2023   home antigen test confirmed (sx: Fever, Shortness of breath, Fatigue, Pain on breathing, Chest pain, Loss or disturbed smell, Runny nose, Headache, Muscle pain, Diarrhea, Brain fog, Sinus pressure   - no hospitalization, no treatment)    COVID-19 vaccine status: Pfizer    Occupation: full-time , currently on intermittent FMLA     Current Providers: PCP Dr Melanie Bee    Survey Scores: 01/2024  PHQ-9: 6    MAYTE-7: 12    Sleep Wellness: 9    FSS average: 6.56    Modified Ecog average: 3.67    MOCA: 18/22 (01/2024)  Overall Health: 60     38 y.o. female with a h/o COVID-19 in March 2023, anxiety and depression, [facility] with c/o Fatigue and PEM, cognitive and memory changes, leg pain, headache, tinnitus, hoarse voice, dry eyes, SOB with exertion, palpitations, dizziness, rashes, diarrhea, bruises, mood changes    Autonomic testing was normal, although was quite symptomatic during the test  Very uncomfortable, dizziness/room spinning, tingling in hands, intense desire to pass out because she felt presyncopal  Noticing dizziness with any exertion, getting range of symptoms with any exertion  To avoid this has been taking it very easy  When carrying the laundry up the stairs, an hour later sitting down feels very dizzy  Sometimes eating certain foods feels very dizzy  Also extreme tiredness, often the next day pain  Working part-time right now, coming up to the end of FMLA and paid sick  time, has to make a decision on going part-time officially which would cause loss of health insurance  Afraid to go back to regular activity and wiping herself out, cannot do yoga for 15 minutes and mopping floors  Feels like breath is not working when she is exerting herself, doing deep breathing and has been doing breath work, cannot get air in, no cough although she noticed that when she has a meeting at work or hour-long phone call her voice gets hoarse  Feels more short of breath when her voice gets hoarse as well  Has not noted any irritants contributing to shortness of breath, wearing N95 mask outside of the house  No chest pain since immediately after COVID illness in March 2023  Palpitations, feels that HR is going too fast and too hard, typically when up and about, sometimes when she takes a nap wakes up feeling this way as well  Headaches are on the back of her head, present when she wakes up, sometimes gets a little bit better throughout the day, different than her typical tension headaches in the front of her head, back of the head feels hot and throbbing  Feels like a migraine  Neck pain as well, leg pain after exertion, leg pain feels like bone ache or shin splints, sometimes gets radiating hip pain  Has not felt feverish since last visit  Dry eyes are ongoing  Rashes are still present, has been taking Epsom salt baths and that seems to help, helps with pain as well  Cannot stay in the water very long because she feels as though she is going to pass out  Diarrhea is here and there, comes and goes, feels more related to amount of activity or stress that she has  Mood has been OK, wants to talk to the Psychologist who specializes in LC, on wait list  Working on gratitude journal and doing meditation, has decent tool kit for that  Has been struggling with cognition and memory, feels slow, writing everything down, still feels out of it all the time  Sleeping well, 7 hours per night, does not feel  refreshed during the day, searching for words  Not nodding off during the day  Has not started B12 supplement yet  Mother notes that she has MTHFR  Wants to look into all possibilities for what is happening with her body, wants to rule things out  Hx of MS on her mom's side of family, mother has fibromyalgia          Relevant prior healthcare visits:  -11/2023 PCP prescribed wellbutrin, continue follow-up with therapist, referral to [facility] for PASC symptoms  -04/2023 ED for chest pain and SOB, work-up reassuring    Relevant prior diagnostic studies:  -04/2024 echocardiogram with LVEF 55-60%, bicuspid aortic valve  -03/2024 PFTs normal  -03/2024 6MWT without desaturation, able to walk 79% predicted distance  -02/2024 autonomic testing normal    Relevant prior laboratory values, unremarkable unless noted:  -02/2024 Vitamin D, Vitamin B12 453, CMP, CK, BNP, Citrulline Ab, iron studies, TSH, ESR, RF, D-Dimer, CBC/D, HbA1c, Folate, Ferritin, CRP, DAVID, am cortisol, Vitamin B1, Troponin, SPEP, Tryptase   -07/2023 Vitamin D 22, HbA1c, Lipids, Citrulline Ab, RF, ESR, CRP, TSH, CMP, CBC/D  -04/2023 D-Dimer    Exercise routine: 60 minutes 5 days per week  Diet:  Weight hx: pre-COVID-19 105  lbs -> post-COVID-19 100  lbs  Substance use: former tobacco use, 1-2 servings ETOH monthly or less   Social:      Current Outpatient Medications:     buPROPion XL (Wellbutrin XL) 150 mg 24 hr tablet, TAKE 1 TABLET (150 MG) BY MOUTH ONCE DAILY IN THE MORNING. DO NOT CRUSH, CHEW, OR SPLIT., Disp: 90 tablet, Rfl: 0    multivitamin with minerals tablet, Take 1 tablet by mouth once daily., Disp: , Rfl:     Past Medical History:   Diagnosis Date    Abnormal Pap smear of cervix     Long COVID        Past Surgical History:   Procedure Laterality Date    CERVICAL BIOPSY  W/ LOOP ELECTRODE EXCISION  2015    per patient       Family History   Problem Relation Name Age of Onset    Fibromyalgia Mother      Multiple sclerosis Mother's Sister          x2     Ovarian cancer Mother's Sister      Multiple sclerosis Mother's Brother      Breast cancer Paternal Grandmother  60 - 69    Colon cancer Neg Hx      Pancreatic cancer Neg Hx         Objective   There were no vitals taken for this visit.    Physical Exam    Assessment/Plan   {Assess/PlanSmartLinks:70389}

## 2024-04-29 ENCOUNTER — DOCUMENTATION (OUTPATIENT)
Dept: OTHER | Facility: CLINIC | Age: 39
End: 2024-04-29
Payer: COMMERCIAL

## 2024-04-29 ENCOUNTER — APPOINTMENT (OUTPATIENT)
Dept: OTHER | Facility: CLINIC | Age: 39
End: 2024-04-29
Payer: COMMERCIAL

## 2024-04-29 NOTE — ASSESSMENT & PLAN NOTE
"03/2024:  Fatigue and PEM, cognitive and memory changes, leg pain, headache, tinnitus, hoarse voice, dry eyes, SOB with exertion, palpitations, dizziness, rashes, diarrhea, bruises, mood changes  -start Vitamin B12 supplement, you can add this to your multivitamin  -to further evaluate your heart, I ordered an echocardiogram and cardiac event monitor  -to further evaluate your lungs, I ordered a chest x-ray and pulmonary function testing  -please call back Jay Jay from [facility] office to schedule your dizziness evaluation with her  -referral to headache neurology  -referral to Tessie Layne for Physical Therapy at [facility] for dizziness, leg pain, fatigue and post-exertional malaise  -referral to Liz Jenkins for Speech therapy at [facility] for cognitive/memory complaints and hoarse voice  -additional recommendations provided under \"Patient Education\" section     01/2024:  Fatigue and PEM, cognitive and memory changes, leg pain, headache, feeling feverish, tinnitus, hoarse voice, dry eyes, SOB with exertion, palpitations, dizziness, rashes, diarrhea, bruises, mood changes  -comprehensive blood work, please have this drawn in the morning, fasting except for water   -autonomic testing to evaluate for dysautonomia as a cause/contribution to your symptoms  -we will consider further lung and/or heart testing pending lab-work and autonomic testing results  -referral to [facility] Psychologist Jasmny Yadav, you will be called to set up this appointment  -use diaphragmatic breathing exercises a few times per day, specifically when you are feeling symptomatic   -increase your water and salt intake, try compression leggings  -additional recommendations provided under \"Patient Education\" section   -> [id number]    "

## 2024-04-29 NOTE — PROGRESS NOTES
Patient canceled her FUV today, below note is incomplete:    Subjective   COVID-19 Infection Date:  December 2020 confirmed (Sx: Fever, Fatigue, Loss or disturbed smell, Headache, Sore throat, Cough, Pain on breathing, Loss or disturbed taste, Muscle pain, Diarrhea, Sinus pressure, Shortness of breath, Brain fog   December 2021 confirmed (Sx: Fever, Shortness of breath, Fatigue, Pain on breathing, Loss or disturbed smell, Runny nose, Muscle pain, Diarrhea, Brain fog, Sore throat, Sinus Pressure   03/24/2023   home antigen test confirmed (sx: Fever, Shortness of breath, Fatigue, Pain on breathing, Chest pain, Loss or disturbed smell, Runny nose, Headache, Muscle pain, Diarrhea, Brain fog, Sinus pressure   - no hospitalization, no treatment)    COVID-19 vaccine status: Pfizer    Occupation: full-time , currently on intermittent FMLA     Current Providers: PCP Dr Melanie Bee    Survey Scores: 01/2024  PHQ-9: 6    MAYTE-7: 12    Sleep Wellness: 9    FSS average: 6.56    Modified Ecog average: 3.67    MOCA: 18/22 (01/2024)  Overall Health: 60     38 y.o. female with a h/o COVID-19 in March 2023, anxiety and depression, [facility] with c/o Fatigue and PEM, cognitive and memory changes, leg pain, headache, tinnitus, hoarse voice, dry eyes, SOB with exertion, palpitations, dizziness, rashes, diarrhea, bruises, mood changes    Autonomic testing was normal, although was quite symptomatic during the test  Very uncomfortable, dizziness/room spinning, tingling in hands, intense desire to pass out because she felt presyncopal  Noticing dizziness with any exertion, getting range of symptoms with any exertion  To avoid this has been taking it very easy  When carrying the laundry up the stairs, an hour later sitting down feels very dizzy  Sometimes eating certain foods feels very dizzy  Also extreme tiredness, often the next day pain  Working part-time right now,  coming up to the end of FMLA and paid sick time, has to make a decision on going part-time officially which would cause loss of health insurance  Afraid to go back to regular activity and wiping herself out, cannot do yoga for 15 minutes and mopping floors  Feels like breath is not working when she is exerting herself, doing deep breathing and has been doing breath work, cannot get air in, no cough although she noticed that when she has a meeting at work or hour-long phone call her voice gets hoarse  Feels more short of breath when her voice gets hoarse as well  Has not noted any irritants contributing to shortness of breath, wearing N95 mask outside of the house  No chest pain since immediately after COVID illness in March 2023  Palpitations, feels that HR is going too fast and too hard, typically when up and about, sometimes when she takes a nap wakes up feeling this way as well  Headaches are on the back of her head, present when she wakes up, sometimes gets a little bit better throughout the day, different than her typical tension headaches in the front of her head, back of the head feels hot and throbbing  Feels like a migraine  Neck pain as well, leg pain after exertion, leg pain feels like bone ache or shin splints, sometimes gets radiating hip pain  Has not felt feverish since last visit  Dry eyes are ongoing  Rashes are still present, has been taking Epsom salt baths and that seems to help, helps with pain as well  Cannot stay in the water very long because she feels as though she is going to pass out  Diarrhea is here and there, comes and goes, feels more related to amount of activity or stress that she has  Mood has been OK, wants to talk to the Psychologist who specializes in LC, on wait list  Working on gratitude journal and doing meditation, has decent tool kit for that  Has been struggling with cognition and memory, feels slow, writing everything down, still feels out of it all the time  Sleeping  well, 7 hours per night, does not feel refreshed during the day, searching for words  Not nodding off during the day  Has not started B12 supplement yet  Mother notes that she has MTHFR  Wants to look into all possibilities for what is happening with her body, wants to rule things out  Hx of MS on her mom's side of family, mother has fibromyalgia          Relevant prior healthcare visits:  -11/2023 PCP prescribed wellbutrin, continue follow-up with therapist, referral to [facility] for PASC symptoms  -04/2023 ED for chest pain and SOB, work-up reassuring    Relevant prior diagnostic studies:  -04/2024 echocardiogram with LVEF 55-60%, bicuspid aortic valve  -03/2024 PFTs normal  -03/2024 6MWT without desaturation, able to walk 79% predicted distance  -02/2024 autonomic testing normal    Relevant prior laboratory values, unremarkable unless noted:  -02/2024 Vitamin D, Vitamin B12 453, CMP, CK, BNP, Citrulline Ab, iron studies, TSH, ESR, RF, D-Dimer, CBC/D, HbA1c, Folate, Ferritin, CRP, DAVID, am cortisol, Vitamin B1, Troponin, SPEP, Tryptase   -07/2023 Vitamin D 22, HbA1c, Lipids, Citrulline Ab, RF, ESR, CRP, TSH, CMP, CBC/D  -04/2023 D-Dimer    Exercise routine: 60 minutes 5 days per week  Diet:  Weight hx: pre-COVID-19 105  lbs -> post-COVID-19 100  lbs  Substance use: former tobacco use, 1-2 servings ETOH monthly or less   Social:      Current Outpatient Medications:     buPROPion XL (Wellbutrin XL) 150 mg 24 hr tablet, TAKE 1 TABLET (150 MG) BY MOUTH ONCE DAILY IN THE MORNING. DO NOT CRUSH, CHEW, OR SPLIT., Disp: 90 tablet, Rfl: 0    multivitamin with minerals tablet, Take 1 tablet by mouth once daily., Disp: , Rfl:     Past Medical History:   Diagnosis Date    Abnormal Pap smear of cervix     Long COVID        Past Surgical History:   Procedure Laterality Date    CERVICAL BIOPSY  W/ LOOP ELECTRODE EXCISION  2015    per patient       Family History   Problem Relation Name Age of Onset    Fibromyalgia Mother      Multiple  sclerosis Mother's Sister          x2    Ovarian cancer Mother's Sister      Multiple sclerosis Mother's Brother      Breast cancer Paternal Grandmother  60 - 69    Colon cancer Neg Hx      Pancreatic cancer Neg Hx         Objective   There were no vitals taken for this visit.    Physical Exam    Assessment/Plan

## 2024-05-07 ENCOUNTER — CLINICAL SUPPORT (OUTPATIENT)
Dept: AUDIOLOGY | Facility: CLINIC | Age: 39
End: 2024-05-07
Payer: COMMERCIAL

## 2024-05-07 ENCOUNTER — OFFICE VISIT (OUTPATIENT)
Dept: OTOLARYNGOLOGY | Facility: CLINIC | Age: 39
End: 2024-05-07
Payer: COMMERCIAL

## 2024-05-07 VITALS — TEMPERATURE: 97.6 F | BODY MASS INDEX: 18.16 KG/M2 | HEIGHT: 63 IN | WEIGHT: 102.5 LBS

## 2024-05-07 DIAGNOSIS — R42 DIZZINESS: ICD-10-CM

## 2024-05-07 DIAGNOSIS — M26.609 TMJ DYSFUNCTION: ICD-10-CM

## 2024-05-07 DIAGNOSIS — R42 DIZZINESS: Primary | ICD-10-CM

## 2024-05-07 DIAGNOSIS — M43.6 NECK STIFFNESS: ICD-10-CM

## 2024-05-07 DIAGNOSIS — H93.13 TINNITUS OF BOTH EARS: ICD-10-CM

## 2024-05-07 DIAGNOSIS — M54.2 NECK PAIN: ICD-10-CM

## 2024-05-07 DIAGNOSIS — G89.29 CHRONIC NONINTRACTABLE HEADACHE, UNSPECIFIED HEADACHE TYPE: ICD-10-CM

## 2024-05-07 DIAGNOSIS — R51.9 CHRONIC NONINTRACTABLE HEADACHE, UNSPECIFIED HEADACHE TYPE: ICD-10-CM

## 2024-05-07 DIAGNOSIS — H92.03 OTALGIA OF BOTH EARS: ICD-10-CM

## 2024-05-07 DIAGNOSIS — R42 VERTIGO: Primary | ICD-10-CM

## 2024-05-07 PROCEDURE — 92550 TYMPANOMETRY & REFLEX THRESH: CPT | Performed by: AUDIOLOGIST

## 2024-05-07 PROCEDURE — 99204 OFFICE O/P NEW MOD 45 MIN: CPT | Performed by: NURSE PRACTITIONER

## 2024-05-07 PROCEDURE — 1036F TOBACCO NON-USER: CPT | Performed by: NURSE PRACTITIONER

## 2024-05-07 PROCEDURE — 92557 COMPREHENSIVE HEARING TEST: CPT | Performed by: AUDIOLOGIST

## 2024-05-07 ASSESSMENT — PATIENT HEALTH QUESTIONNAIRE - PHQ9
2. FEELING DOWN, DEPRESSED OR HOPELESS: NOT AT ALL
SUM OF ALL RESPONSES TO PHQ9 QUESTIONS 1 AND 2: 0
1. LITTLE INTEREST OR PLEASURE IN DOING THINGS: NOT AT ALL
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
## 2024-05-07 NOTE — PROGRESS NOTES
Chief Complaint   Patient presents with    Dizziness      HISTORY:  Rosa Andrew, age 38 years, was seen for audiogram in conjunction with otolaryngology appointment on 5/7/2024.  Ms. Andrew reports dizziness since samuel covid last year, she has been diagnosed with covid four times.  There is no report of hearing loss but she does note periodic tinnitus in both ears.  There is no ear pain, ear pressure, middle ear pathology or history of ear surgery.    RESULTS:  Prior to testing both external auditory canals contained mild cerumen and tympanic membranes visualized    Immittance and acoustic reflexes:  Immittance testing yielded TYPE A tympanograms indicating normal middle ear function both ears  Acoustic reflexes were present 500 - 4000 Hz both ears    Audiogram:  Normal hearing levels were obtained 250 - 8000 Hz both ears  Speech reception thresholds obtained at 10 dBHL both ears  Speech discrimination scores were 100% at 50 dBHL    Distortion product otoacoustic emissions:  Robust emissions were obtained 2000 -8000 Hz both ears    IMPRESSIONS:  Normal middle ear function noted both ears  Normal acoustic reflexes noted both ears  Robust distortion product otoacoustic emissions indicate normal cochlear function both ears  Normal hearing levels     RECOMMENDATIONS:  1.  Follow up with otolaryngology  2.  Retest hearing levels annually    time: 953 - 1005

## 2024-05-07 NOTE — PROGRESS NOTES
RAM[facility] Virtual The virtual visit conducted with Audio and Video.    Verbal consent was given for the following virtual visit, patient is currently located in [place]. All issues discussed and addressed below were done so without a physical examination. If it was felt the patient needed be seen in clinic in person they were directed there.     Subjective   COVID-19 Infection Date:  December 2020 confirmed (Sx: Fever, Fatigue, Loss or disturbed smell, Headache, Sore throat, Cough, Pain on breathing, Loss or disturbed taste, Muscle pain, Diarrhea, Sinus pressure, Shortness of breath, Brain fog   December 2021 confirmed (Sx: Fever, Shortness of breath, Fatigue, Pain on breathing, Loss or disturbed smell, Runny nose, Muscle pain, Diarrhea, Brain fog, Sore throat, Sinus Pressure   03/24/2023   home antigen test confirmed (sx: Fever, Shortness of breath, Fatigue, Pain on breathing, Chest pain, Loss or disturbed smell, Runny nose, Headache, Muscle pain, Diarrhea, Brain fog, Sinus pressure   - no hospitalization, no treatment)    COVID-19 vaccine status: Pfizer    Occupation: full-time , currently on intermittent FMLA     Current Providers: PCP Dr Melanie Bee, ENT CNP [facility]    Survey Scores: 01/2024  PHQ-9: 6    MAYTE-7: 12    Sleep Wellness: 9    FSS average: 6.56    Modified Ecog average: 3.67    MOCA: 18/22 (01/2024)  Overall Health: 60     38 y.o. female with a h/o COVID-19 in March 2023, anxiety and depression, presents for follow-[facility] with c/o Fatigue and PEM, cognitive and memory changes, leg pain, headache, tinnitus, hoarse voice, dry eyes, SOB with exertion, palpitations, dizziness, rashes, diarrhea, bruises, mood changes    Has been doing OK  Still dealing with fatigue and some pain, but less  Still having brain fog  Has scaled back activity and that has helped  Summer is busy at work, preparing for that by eating well and regularly, be conservative with her energy,  keep consistent early bedtime, and such  Has been sleeping OK, around 6 hours per night, staying asleep is a problem, usually wakes up around 3 or 4am, hard time getting back to sleep then   Usually lays there and pets her cat, avoids going on her phone  Still experiencing dizziness with activity, appears to be less if she is doing less  Some days showers are more tolerable which is an improvement, now only once or twice per week she has to sit afterwards  Had ENT appointment this week and provider mentioned a different expression of migraines that she had in the past  Hearing test didn't indicate any inner ear problem  Any activity or stress causes her symptoms to ramp up, which is similar to how she experienced migraines 10 years ago  Doesn't feel like migraines she had before but can see that connection  Headaches are the same, when she keeps the activity levels low then is OK, when she is busy or stressful then it is likely she gets a headache a day or two afterwards  Palpitations are still there, usually right after activity such as doing laundry, sweeping the floor  Doing smaller chunks of activity, 15 minutes instead of an hour of cleaning  Tinnitus is unchanged  Voice is still getting hoarse as well, not always after talking a lot, unsure what is causing that  ENT recommended PT who has specialty with neck troubles  No dry mouth, dry eyes are continuing  SOB has not been as noticeable with low activity, had a few moments this week of needing to take a deep breath to get enough air which didn't match the activity she was doing  No rashes, still with bruising, has a big bruise on the back of her calf and does not remember any injury, finger-print size bruising around her knees, no abnormal bleeding  Diarrhea still comes and goes, no triggers that she is aware of, eating basic diet (vegetable, grain, protein)  Mood has been OK, some anxiety but that matches what is going on at work with big move going on  Went  off wellbutrin off autonomic test, didn't want to restart it because depression is more so a winter problem and wellbutrin caused anxiety when she first started it  Started Vitamin B12 supplement  Also has been taking probiotic and both of those seem to help  Still interested in pursuing PT and SLP, was overwhelmed with appointment and spacing things out  Will need FMLA extended so that she can take time off for appointments  Starting to work on acceptance and management of condition as testing comes back normal  Overall improving, slowly  May consider antidepressant closer to the fall    Relevant prior healthcare visits:  -05/2024 ENT notes normal otologic exam, audiogram, balance testing. Does not believe patient has a peripheral vestibular disorder. Recommends PT for TMJ and cervicogenic issues that are believed to contribute to headaches, unsure if migraines. Given handout for dietary recommendations for migraines. If symptoms do not improve with PT recs balance function testing.   -11/2023 PCP prescribed wellbutrin, continue follow-up with therapist, referral to [facility] for PASC symptoms  -04/2023 ED for chest pain and SOB, work-up reassuring    Relevant prior diagnostic studies:  -04/2024 echocardiogram with LVEF 55-60%, bicuspid aortic valve  -03/2024 PFTs normal  -03/2024 6MWT without desaturation, able to walk 79% predicted distance  -02/2024 autonomic testing normal    Relevant prior laboratory values, unremarkable unless noted:  -02/2024 Vitamin D, Vitamin B12 453, CMP, CK, BNP, Citrulline Ab, iron studies, TSH, ESR, RF, D-Dimer, CBC/D, HbA1c, Folate, Ferritin, CRP, DAVID, am cortisol, Vitamin B1, Troponin, SPEP, Tryptase   -07/2023 Vitamin D 22, HbA1c, Lipids, Citrulline Ab, RF, ESR, CRP, TSH, CMP, CBC/D  -04/2023 D-Dimer    Exercise routine: 60 minutes 5 days per week  Diet:  Weight hx: pre-COVID-19 105  lbs -> post-COVID-19 100  lbs  Substance use: former tobacco use, 1-2 servings ETOH monthly or less  "  Social:      Current Outpatient Medications:     multivitamin with minerals tablet, Take 1 tablet by mouth once daily., Disp: , Rfl:     Past Medical History:   Diagnosis Date    Abnormal Pap smear of cervix     Long COVID        Past Surgical History:   Procedure Laterality Date    CERVICAL BIOPSY  W/ LOOP ELECTRODE EXCISION  2015    per patient       Family History   Problem Relation Name Age of Onset    Fibromyalgia Mother      Multiple sclerosis Mother's Sister          x2    Ovarian cancer Mother's Sister      Multiple sclerosis Mother's Brother      Breast cancer Paternal Grandmother  60 - 69    Colon cancer Neg Hx      Pancreatic cancer Neg Hx         Objective   There were no vitals taken for this visit.    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  -- [facility] Fax Number [phone]   -- [facility] email address [email or web address]   -follow-up on prior referrals to Physical therapy and speech therapy  -we can consider a different anti-depressant/anti-anxiety medication as needed, use the smartphone azam \"Unwinding Anxiety\" for support as well  -continue with increased water and salt intake, use compression to legs and abdomen with activities  -follow up on prior referral to headache " "neurology    03/2024:  Fatigue and PEM, cognitive and memory changes, leg pain, headache, tinnitus, hoarse voice, dry eyes, SOB with exertion, palpitations, dizziness, rashes, diarrhea, bruises, mood changes  -start Vitamin B12 supplement, you can add this to your multivitamin  -to further evaluate your heart, I ordered an echocardiogram and cardiac event monitor  -to further evaluate your lungs, I ordered a chest x-ray and pulmonary function testing  -please call back Jay Jay from [facility] office to schedule your dizziness evaluation with her  -referral to headache neurology  -referral to Tessie Layne for Physical Therapy at [facility] for dizziness, leg pain, fatigue and post-exertional malaise  -referral to Liz Jenkins for Speech therapy at [facility] for cognitive/memory complaints and hoarse voice  -additional recommendations provided under \"Patient Education\" section     01/2024:  Fatigue and PEM, cognitive and memory changes, leg pain, headache, feeling feverish, tinnitus, hoarse voice, dry eyes, SOB with exertion, palpitations, dizziness, rashes, diarrhea, bruises, mood changes  -comprehensive blood work, please have this drawn in the morning, fasting except for water   -autonomic testing to evaluate for dysautonomia as a cause/contribution to your symptoms  -we will consider further lung and/or heart testing pending lab-work and autonomic testing results  -referral to [facility] Psychologist Jasmyn Yadav, you will be called to set up this appointment  -use diaphragmatic breathing exercises a few times per day, specifically when you are feeling symptomatic   -increase your water and salt intake, try compression leggings  -additional recommendations provided under \"Patient Education\" section   -> [facility] [email or web address]            "

## 2024-05-10 ENCOUNTER — TELEMEDICINE (OUTPATIENT)
Dept: OTHER | Facility: CLINIC | Age: 39
End: 2024-05-10
Payer: COMMERCIAL

## 2024-05-10 DIAGNOSIS — U09.9 POST-ACUTE SEQUELAE OF COVID-19 (PASC): Primary | ICD-10-CM

## 2024-05-10 PROCEDURE — 99214 OFFICE O/P EST MOD 30 MIN: CPT | Mod: 95 | Performed by: NURSE PRACTITIONER

## 2024-05-10 NOTE — PATIENT INSTRUCTIONS
"It was my pleasure seeing you in the [facility] today.  We will focus on addressing the following symptoms discussed today: Fatigue and PEM, cognitive and memory changes, leg pain, headache, tinnitus, hoarse voice, dry eyes, SOB with exertion, palpitations, dizziness, rashes, diarrhea, bruises, mood changes    My recommendations are as follows:  -continue your current Vitamin B12 supplement, we will recheck labs at time of your next follow up visit  -please send intermittent FMLA paperwork via fax, email or through [employer].  -- [facility] Fax Number [phone]   -- [facility] email address [email or web address]   -follow-up on prior referrals to Physical therapy and speech therapy  -we can consider a different anti-depressant/anti-anxiety medication as needed, use the smartphone azam \"Unwinding Anxiety\" for support as well  -continue with increased water and salt intake, use compression to legs and abdomen with activities  -follow up on prior referral to headache neurology    Please return to [facility] in 3 months, call [phone] or send a message through your [employer] azam if needed.    Please also consider attending  PICS support group for long-COVID and post-ICU patients. To contact support group, email [email or web address] or call [phone]   "

## 2024-05-10 NOTE — ASSESSMENT & PLAN NOTE
"05/2024:  Fatigue and PEM, cognitive and memory changes, leg pain, headache, tinnitus, hoarse voice, dry eyes, SOB with exertion, palpitations, dizziness, rashes, diarrhea, bruises, mood changes  -continue your current Vitamin B12 supplement, we will recheck labs at time of your next follow up visit  -please send intermittent FMLA paperwork via fax, email or through [employer].  --[facility] Fax Number [phone]   --[facility] email address [email or web address]   -follow-up on prior referrals to Physical therapy and speech therapy  -we can consider a different anti-depressant/anti-anxiety medication as needed, use the smartphone azam \"Unwinding Anxiety\" for support as well  -continue with increased water and salt intake, use compression to legs and abdomen with activities  -follow up on prior referral to headache neurology    03/2024:  Fatigue and PEM, cognitive and memory changes, leg pain, headache, tinnitus, hoarse voice, dry eyes, SOB with exertion, palpitations, dizziness, rashes, diarrhea, bruises, mood changes  -start Vitamin B12 supplement, you can add this to your multivitamin  -to further evaluate your heart, I ordered an echocardiogram and cardiac event monitor  -to further evaluate your lungs, I ordered a chest x-ray and pulmonary function testing  -please call back Zack from [facility] [place] office to schedule your dizziness evaluation with her  -referral to headache neurology  -referral to Tessie Layne for Physical Therapy at [facility] for dizziness, leg pain, fatigue and post-exertional malaise  -referral to Liz Jenkins for Speech therapy at [facility] for cognitive/memory complaints and hoarse voice  -additional recommendations provided under \"Patient Education\" section     01/2024:  Fatigue and PEM, cognitive and memory changes, leg pain, headache, feeling feverish, tinnitus, hoarse voice, dry eyes, SOB with exertion, " "palpitations, dizziness, rashes, diarrhea, bruises, mood changes  -comprehensive blood work, please have this drawn in the morning, fasting except for water   -autonomic testing to evaluate for dysautonomia as a cause/contribution to your symptoms  -we will consider further lung and/or heart testing pending lab-work and autonomic testing results  -referral to [facility] Psychologist Jasmyn Yadav, you will be called to set up this appointment  -use diaphragmatic breathing exercises a few times per day, specifically when you are feeling symptomatic   -increase your water and salt intake, try compression leggings  -additional recommendations provided under \"Patient Education\" section   -> [facility]    "

## 2024-07-11 ENCOUNTER — APPOINTMENT (OUTPATIENT)
Dept: PRIMARY CARE | Facility: CLINIC | Age: 39
End: 2024-07-11
Payer: COMMERCIAL

## 2024-07-11 VITALS
WEIGHT: 96 LBS | SYSTOLIC BLOOD PRESSURE: 119 MMHG | BODY MASS INDEX: 17.01 KG/M2 | DIASTOLIC BLOOD PRESSURE: 67 MMHG | HEART RATE: 76 BPM | OXYGEN SATURATION: 98 %

## 2024-07-11 DIAGNOSIS — F41.9 ANXIETY AND DEPRESSION: ICD-10-CM

## 2024-07-11 DIAGNOSIS — M54.2 NECK PAIN: Primary | ICD-10-CM

## 2024-07-11 DIAGNOSIS — M24.9 HYPERMOBILITY OF JOINT: ICD-10-CM

## 2024-07-11 DIAGNOSIS — B37.31 VAGINAL CANDIDIASIS: ICD-10-CM

## 2024-07-11 DIAGNOSIS — Q23.1 BICUSPID AORTIC VALVE (HHS-HCC): ICD-10-CM

## 2024-07-11 DIAGNOSIS — U09.9 POST-ACUTE SEQUELAE OF COVID-19 (PASC): ICD-10-CM

## 2024-07-11 DIAGNOSIS — F32.A ANXIETY AND DEPRESSION: ICD-10-CM

## 2024-07-11 PROBLEM — Q23.81 BICUSPID AORTIC VALVE: Status: ACTIVE | Noted: 2024-07-11

## 2024-07-11 PROCEDURE — 99214 OFFICE O/P EST MOD 30 MIN: CPT | Performed by: INTERNAL MEDICINE

## 2024-07-11 RX ORDER — FLUCONAZOLE 150 MG/1
150 TABLET ORAL ONCE
Qty: 1 TABLET | Refills: 0 | Status: SHIPPED | OUTPATIENT
Start: 2024-07-11 | End: 2024-07-11

## 2024-07-11 ASSESSMENT — PAIN SCALES - GENERAL: PAINLEVEL: 0-NO PAIN

## 2024-07-11 NOTE — PROGRESS NOTES
Subjective   Patient ID: Rosa Andrew is a 39 y.o. female who presents for Cough and Fatigue (Lingering COVID Sx. /).  HPI  39-year-old female here for follow-up visit, concern about long COVID.  - Notes joint hypermobility, jaw dislocation, easy bruising, slow to heal, scarring, concerned about possible EDS. Partner are talking about possibly starting a family.   - Lost nearly 10 pounds since last being seen despite eating more. Denies night sweats, intermittent feverish feeling, no additional constitutional symptoms.   - Twisted and felt acute back spasm for a few minutes, wore an ice pack on her back all day with some improvement in symptoms.     Past medical history  -Long COVID-referred to COVID recovery clinic seen by Nancy Paredes started a B12 supplement ordered for TTE event monitor x-ray PFTs, referral to ENT headache neurology, PT, speech therapy, tilt table was negative.   -TMJ  -Anxiety and insomnia-previously on Remeron, Zoloft, Prozac, remote benzo in the past all resulted in more flatness is close to functional-started Wellbutrin at last visit which did result on more anxiousness and more weepiness, stopped during tilt table test and did not reintroduce. Overall symptoms are ok. Has been working on managing energy levels, sleep and eating well.   -Old neck injury and lipoma-refer to surgery  -Bicuspid aortic valve  - Strong family history of MS     TTE 3/24: EF 55 to 60%, bicuspid aortic valve  Holter not yet performed    Social:   - Former smoker - quit 2 months ago, 1-2 cigs/day, in mid to late 20s smoked 1/2 PPD x 5 years   - Former mild alcohol use, now discontinued - 1 drink would result in horrible hangover or headache   - Intermittent marijuana   -    - Female partner.   Current Outpatient Medications   Medication Instructions    multivitamin with minerals tablet 1 tablet, oral, Daily        Objective     /67   Pulse 76   Wt (!) 43.5 kg (96 lb)   SpO2 98%   BMI 17.01 kg/m²      Physical Exam  General: Alert and oriented, in no apparent distress   HEENT: No conjunctival erythema, no external facial lesions   Lungs: Breathing comfortably  Skin: No evidence of skin breakdown.  Neuro: AAO x 3, answering questions appropriately, no obvious cranial nerve deficits     Assessment/Plan   Assessment/Plan   Problem List Items Addressed This Visit       Anxiety and depression    Current Assessment & Plan     Off all medical management with multiple intolerances in the past.  Overall symptoms are stable in conjunction with strong lifestyle management.         Post-acute sequelae of COVID-19 (PASC)    Current Assessment & Plan     Followed by Angelina Martins, thus far attempted medical treatments have not been successful though does note improvement in symptoms with intensive lifestyle modifications.  Has adequate follow-up scheduled and significant workup for dysautonomia, and alternate etiologies and treatments are undergoing.         Neck pain - Primary   - With muscle spasms, trial of magnesium gluconate    Bicuspid aortic valve (HHS-HCC)    Current Assessment & Plan     Newly diagnosed, blood pressure readings are within normal limits.  Interested in referral to cardiology         Relevant Orders    Referral to Cardiology     Other Visit Diagnoses       Hypermobility of joint        Relevant Orders    Referral to Genetics    Vaginal candidiasis       Rx fluconazole             Health Maintenance  Cancer screening:   - Pap 3/24+ HPV negative

## 2024-07-15 NOTE — ASSESSMENT & PLAN NOTE
Off all medical management with multiple intolerances in the past.  Overall symptoms are stable in conjunction with strong lifestyle management.

## 2024-07-15 NOTE — ASSESSMENT & PLAN NOTE
Newly diagnosed, blood pressure readings are within normal limits.  Interested in referral to cardiology

## 2024-07-15 NOTE — ASSESSMENT & PLAN NOTE
Followed by Angelina Martins, thus far attempted medical treatments have not been successful though does note improvement in symptoms with intensive lifestyle modifications.  Has adequate follow-up scheduled and significant workup for dysautonomia, and alternate etiologies and treatments are undergoing.

## 2024-08-06 ENCOUNTER — TELEPHONE (OUTPATIENT)
Dept: OTHER | Facility: CLINIC | Age: 39
End: 2024-08-06
Payer: COMMERCIAL

## 2024-08-06 NOTE — TELEPHONE ENCOUNTER
Left message regarding surveys.  Instructions on how to find/complete were left in the VM.  Patient was asked to completed as soon as possible as we are starting to review for next week.

## 2024-08-08 ENCOUNTER — TELEPHONE (OUTPATIENT)
Dept: OTHER | Facility: CLINIC | Age: 39
End: 2024-08-08
Payer: COMMERCIAL

## 2024-08-08 NOTE — TELEPHONE ENCOUNTER
Left message for patient with reminder that the required COVID Recovery questionnaires were not completed as yet.  Patient was given a deadline of 8/9/24 end of business day to complete the forms to avoid the cancellation of the appointment.  If forms are not completed, appointment will be cancelled and the patient can call the office to reschedule if needed.

## 2024-08-12 NOTE — PROGRESS NOTES
{JTVisit:97114}    Subjective   COVID-19 Infection Date:  December 2020 confirmed (Sx: Fever, Fatigue, Loss or disturbed smell, Headache, Sore throat, Cough, Pain on breathing, Loss or disturbed taste, Muscle pain, Diarrhea, Sinus pressure, Shortness of breath, Brain fog   December 2021 confirmed (Sx: Fever, Shortness of breath, Fatigue, Pain on breathing, Loss or disturbed smell, Runny nose, Muscle pain, Diarrhea, Brain fog, Sore throat, Sinus Pressure   03/24/2023   home antigen test confirmed (sx: Fever, Shortness of breath, Fatigue, Pain on breathing, Chest pain, Loss or disturbed smell, Runny nose, Headache, Muscle pain, Diarrhea, Brain fog, Sinus pressure   - no hospitalization, no treatment)    COVID-19 vaccine status: Pfizer    Occupation: full-time , currently on intermittent FMLA     Current Providers: PCP Dr Melanie Bee, ENT CNP [facility]    Survey Scores: 01/2024 -> 08/2024  PHQ-9: 6  ->     MAYTE-7: 12  ->     Sleep Wellness: 9  ->     FSS average: 6.56  ->     Modified Ecog average: 3.67  ->     MOCA: 18/22 (01/2024)  Overall Health: 60 ->       39 y.o. female with a h/o COVID-19 in March 2023, anxiety and depression, presents [facility] with c/o Fatigue and PEM, cognitive and memory changes, leg pain, headache, tinnitus, hoarse voice, dry eyes, SOB with exertion, palpitations, dizziness, rashes, diarrhea, bruises, mood changes    Has been doing OK  Still dealing with fatigue and some pain, but less  Still having brain fog  Has scaled back activity and that has helped  Summer is busy at work, preparing for that by eating well and regularly, be conservative with her energy, keep consistent early bedtime, and such  Has been sleeping OK, around 6 hours per night, staying asleep is a problem, usually wakes up around 3 or 4am, hard time getting back to sleep then   Usually lays there and pets her cat, avoids going on her phone  Still experiencing  dizziness with activity, appears to be less if she is doing less  Some days showers are more tolerable which is an improvement, now only once or twice per week she has to sit afterwards  Had ENT appointment this week and provider mentioned a different expression of migraines that she had in the past  Hearing test didn't indicate any inner ear problem  Any activity or stress causes her symptoms to ramp up, which is similar to how she experienced migraines 10 years ago  Doesn't feel like migraines she had before but can see that connection  Headaches are the same, when she keeps the activity levels low then is OK, when she is busy or stressful then it is likely she gets a headache a day or two afterwards  Palpitations are still there, usually right after activity such as doing laundry, sweeping the floor  Doing smaller chunks of activity, 15 minutes instead of an hour of cleaning  Tinnitus is unchanged  Voice is still getting hoarse as well, not always after talking a lot, unsure what is causing that  ENT recommended PT who has specialty with neck troubles  No dry mouth, dry eyes are continuing  SOB has not been as noticeable with low activity, had a few moments this week of needing to take a deep breath to get enough air which didn't match the activity she was doing  No rashes, still with bruising, has a big bruise on the back of her calf and does not remember any injury, finger-print size bruising around her knees, no abnormal bleeding  Diarrhea still comes and goes, no triggers that she is aware of, eating basic diet (vegetable, grain, protein)  Mood has been OK, some anxiety but that matches what is going on at work with big move going on  Went off wellbutrin off autonomic test, didn't want to restart it because depression is more so a winter problem and wellbutrin caused anxiety when she first started it  Started Vitamin B12 supplement  Also has been taking probiotic and both of those seem to help  Still  interested in pursuing PT and SLP, was overwhelmed with appointment and spacing things out  Will need FMLA extended so that she can take time off for appointments  Starting to work on acceptance and management of condition as testing comes back normal  Overall improving, slowly  May consider antidepressant closer to the fall                      Relevant prior healthcare visits:  -07/2024 PCP notes patient did not tolerate wellbutrin, trial mag for muscle spasms and neck pain, referred to cardiology for bicuspid aortic valve, referred to genetics for hypermobility  -05/2024 ENT notes normal otologic exam, audiogram, balance testing. Does not believe patient has a peripheral vestibular disorder. Recommends PT for TMJ and cervicogenic issues that are believed to contribute to headaches, unsure if migraines. Given handout for dietary recommendations for migraines. If symptoms do not improve with PT recs balance function testing.   -11/2023 PCP prescribed wellbutrin, continue follow-up with therapist, referral to [facility] for PASC symptoms  -04/2023 ED for chest pain and SOB, work-up reassuring    Relevant prior diagnostic studies:  -04/2024 echocardiogram with LVEF 55-60%, bicuspid aortic valve  -03/2024 PFTs normal  -03/2024 6MWT without desaturation, able to walk 79% predicted distance  -02/2024 autonomic testing normal    Relevant prior laboratory values, unremarkable unless noted:  -02/2024 Vitamin D, Vitamin B12 453, CMP, CK, BNP, Citrulline Ab, iron studies, TSH, ESR, RF, D-Dimer, CBC/D, HbA1c, Folate, Ferritin, CRP, DAVID, am cortisol, Vitamin B1, Troponin, SPEP, Tryptase   -07/2023 Vitamin D 22, HbA1c, Lipids, Citrulline Ab, RF, ESR, CRP, TSH, CMP, CBC/D  -04/2023 D-Dimer    Exercise routine: 60 minutes 5 days per week  Diet:  Weight hx: pre-COVID-19 105  lbs -> post-COVID-19 100  lbs ->   lbs  Substance use: former tobacco use, 1-2 servings ETOH monthly or less   Social:      Current Outpatient Medications:      multivitamin with minerals tablet, Take 1 tablet by mouth once daily., Disp: , Rfl:     Past Medical History:   Diagnosis Date    Abnormal Pap smear of cervix     Long COVID        Past Surgical History:   Procedure Laterality Date    CERVICAL BIOPSY  W/ LOOP ELECTRODE EXCISION  2015    per patient       Family History   Problem Relation Name Age of Onset    Fibromyalgia Mother      Multiple sclerosis Mother's Sister          x2    Ovarian cancer Mother's Sister      Multiple sclerosis Mother's Brother      Breast cancer Paternal Grandmother  60 - 69    Colon cancer Neg Hx      Pancreatic cancer Neg Hx         Objective   There were no vitals taken for this visit.    Physical Exam    Assessment/Plan   {Assess/PlanSmartLinks:15699}

## 2024-08-14 ENCOUNTER — TELEPHONE (OUTPATIENT)
Dept: OTHER | Facility: CLINIC | Age: 39
End: 2024-08-14
Payer: COMMERCIAL

## 2024-08-14 NOTE — TELEPHONE ENCOUNTER
Voicemail left for patient in regards of cancelled appointment, after multiple notifications from our office in regards of form completions, required forms have not been completed. Direct contact number left so patient can reach me without difficulty for assistance with rescheduling.

## 2024-08-16 ENCOUNTER — APPOINTMENT (OUTPATIENT)
Dept: OTHER | Facility: CLINIC | Age: 39
End: 2024-08-16
Payer: COMMERCIAL

## 2024-11-10 NOTE — PROGRESS NOTES
Primary Care Physician: Melanie Bee DO      Date of Visit: 11/14/2024 10:20 AM EST  Location of visit: [facility]   Type of Visit: New Patient       HPI / Summary:   Rosa Andrew is a very pleasant 39 y.o. female presenting for management of recently diagnosed bicuspid AV    She has a history of        ROS: Full 10 pt review of symptoms of negative unless discussed above.     Problems:   Patient Active Problem List   Diagnosis    Insomnia due to medical condition    Anxiety and depression    Post-acute sequelae of COVID-19 (PASC)    Episodic tension-type headache    Intrinsic eczema    Lipoma of scalp    Mold exposure    Myofascial muscle pain    Neck pain    Nevus    PMS (premenstrual syndrome)    Polyneuropathy    Recurrent major depression resistant to treatment (CMS-HCC)    Sleep-related bruxism    Visit for routine gyn exam    Vaginal discharge    Bicuspid aortic valve     Medical History:   Past Medical History:   Diagnosis Date    Abnormal Pap smear of cervix     Long COVID      Surgical Hx:   Past Surgical History:   Procedure Laterality Date    CERVICAL BIOPSY  W/ LOOP ELECTRODE EXCISION  2015    per patient      Social Hx:   Tobacco Use: Medium Risk (7/11/2024)    Patient History     Smoking Tobacco Use: Former     Smokeless Tobacco Use: Never     Passive Exposure: Not on file     Alcohol Use: Not At Risk (1/1/2024)    AUDIT-C     Frequency of Alcohol Consumption: Monthly or less     Average Number of Drinks: 1 or 2     Frequency of Binge Drinking: Never     Family Hx:   Family History   Problem Relation Name Age of Onset    Fibromyalgia Mother      Multiple sclerosis Mother's Sister          x2    Ovarian cancer Mother's Sister      Multiple sclerosis Mother's Brother      Breast cancer Paternal Grandmother  60 - 69    Colon cancer Neg Hx      Pancreatic cancer Neg Hx        Exam:   Vitals: There were no vitals filed for this visit.  Wt Readings from Last 5 Encounters:   07/11/24 (!) 43.5 kg  (96 lb)   05/07/24 46.5 kg (102 lb 8 oz)   03/13/24 47.7 kg (105 lb 3.2 oz)   01/25/24 (!) 44.4 kg (97 lb 12.8 oz)   11/14/23 45.4 kg (100 lb)      Physical Exam  Labs:   Recent Labs     02/09/24  0907 07/13/23  1115 05/17/22  1151   WBC 4.4 5.3 4.7   HGB 12.6 13.9 12.2   HCT 38.4 43.8 38.4    244 242   MCV 93 98 99     Recent Labs     02/09/24  0907 07/13/23  1115 05/17/22  1151    139 139   K 4.4 4.5 4.5    104 109*   BUN 10 9 6   CREATININE 0.61 0.63 0.53      Recent Labs     02/09/24  0907 07/13/23  1115   HGBA1C 5.3 5.0   BNP 14  --    FERRITIN 63  --    TIBC 327  --    IRONSAT 24*  --      Lab Results   Component Value Date    CHOL 171 07/13/2023    HDL 72.9 07/13/2023    LDLF 86 07/13/2023    TRIG 63 07/13/2023     Notable Studies: imaging personally reviewed and summarized by me below  EKG:  No results found for this or any previous visit (from the past 4464 hours).    Echo:  - Echo (4/1/2024)  PHYSICIAN INTERPRETATION:  Left Ventricle: The left ventricular systolic function is normal, with an estimated ejection fraction of 55-60%. There are no regional wall motion abnormalities. The left ventricular cavity size is normal. Spectral Doppler shows a normal pattern of left ventricular diastolic filling.  Left Atrium: The left atrium is mildly dilated.  Right Ventricle: The right ventricle is normal in size. There is normal right ventricular global systolic function.  Right Atrium: The right atrium is normal in size.  Aortic Valve: The aortic valve appears abnormal. There is no evidence of aortic valve regurgitation. The peak instantaneous gradient of the aortic valve is 9.2 mmHg. Bicuspid aortic valve.  Mitral Valve: The mitral valve is normal in structure. There is no evidence of mitral valve regurgitation.  Tricuspid Valve: The tricuspid valve is structurally normal. No evidence of tricuspid regurgitation.  Pulmonic Valve: The pulmonic valve is structurally normal. There is no indication of  pulmonic valve regurgitation.  Pericardium: There is no pericardial effusion noted.  Aorta: The aortic root is normal.  Systemic Veins: The inferior vena cava appears to be of normal size. There is IVC inspiratory collapse greater than 50%.        CONCLUSIONS   1. Left ventricular systolic function is normal with a 55-60% estimated ejection fraction.   2. Bicuspid aortic valve.       Current Outpatient Medications   Medication Instructions    multivitamin with minerals tablet 1 tablet, oral, Daily     Impressions and Plan:    #      Patient Instructions:  If you have any questions or need cardiac medication refills, please call my office at [phone],      To reach my office please call [phone]  To schedule an appointment call [phone].          ____________________________________________________________  Carlee El MD  Division of Cardiovascular Medicine  [facility]  [facility]

## 2024-11-14 ENCOUNTER — APPOINTMENT (OUTPATIENT)
Dept: CARDIOLOGY | Facility: HOSPITAL | Age: 39
End: 2024-11-14
Payer: COMMERCIAL

## 2024-11-14 ENCOUNTER — TELEPHONE (OUTPATIENT)
Dept: OTHER | Facility: CLINIC | Age: 39
End: 2024-11-14
Payer: COMMERCIAL

## 2024-11-14 DIAGNOSIS — Q23.81 BICUSPID AORTIC VALVE: ICD-10-CM

## 2025-06-10 ENCOUNTER — APPOINTMENT (OUTPATIENT)
Dept: PRIMARY CARE | Facility: CLINIC | Age: 40
End: 2025-06-10
Payer: COMMERCIAL

## 2025-06-13 ENCOUNTER — OFFICE VISIT (OUTPATIENT)
Dept: CARDIOLOGY | Facility: CLINIC | Age: 40
End: 2025-06-13
Payer: COMMERCIAL

## 2025-06-13 VITALS
OXYGEN SATURATION: 98 % | HEART RATE: 64 BPM | WEIGHT: 93.1 LBS | SYSTOLIC BLOOD PRESSURE: 127 MMHG | BODY MASS INDEX: 16.49 KG/M2 | DIASTOLIC BLOOD PRESSURE: 78 MMHG

## 2025-06-13 DIAGNOSIS — Q23.81 BICUSPID AORTIC VALVE: Primary | ICD-10-CM

## 2025-06-13 PROCEDURE — 1036F TOBACCO NON-USER: CPT | Performed by: HOSPITALIST

## 2025-06-13 PROCEDURE — 99204 OFFICE O/P NEW MOD 45 MIN: CPT | Performed by: HOSPITALIST

## 2025-06-13 PROCEDURE — 99212 OFFICE O/P EST SF 10 MIN: CPT | Performed by: HOSPITALIST

## 2025-06-13 ASSESSMENT — COLUMBIA-SUICIDE SEVERITY RATING SCALE - C-SSRS
2. HAVE YOU ACTUALLY HAD ANY THOUGHTS OF KILLING YOURSELF?: NO
1. IN THE PAST MONTH, HAVE YOU WISHED YOU WERE DEAD OR WISHED YOU COULD GO TO SLEEP AND NOT WAKE UP?: NO
6. HAVE YOU EVER DONE ANYTHING, STARTED TO DO ANYTHING, OR PREPARED TO DO ANYTHING TO END YOUR LIFE?: NO

## 2025-06-13 NOTE — PROGRESS NOTES
Subjective   Rosa Andrew is a 39 y.o. female with PMH of bicuspid aortic valve, polyneuropathy, COVID, tension headache, anxiety, depression, and other comorbidities, who is here to establish cardiac care.  Patient stated that she had multiple COVID infections, most recent in late last year.  She has been complaining of dyspnea exertion, dizziness with exertion, and fatigue for last 1 months.  She noticed dyspnea exertion if she has to go up several flights of stairs, or even 1 flight while carrying something.  She also reports occasional chest tightness with exertion.  No palpitation, orthopnea, or PND.  Maternal aunt and uncle with some heart problems but unclear what it is.    Patient is not on any cardiac medications.  There is blood pressure is 127/78, heart rate 64.    Most recent blood work from 2/9/2024 with creatinine 0.61, negative troponin, A1c 5.3, and unremarkable CBC.    EKG from 11/19/2024, reviewed, showed NSR with nonspecific T changes.    TTE on 4/1/2024: Reviewed  1. Left ventricular systolic function is normal with a 55-60% estimated ejection fraction.  2. Bicuspid aortic valve.      Review of Systems  ROS is negative other than in HPI.      Objective   Physical Exam  General: NAD  HEENT: IEOM, PERRL   Neck: No JVD or carotid bruit  Lungs: CTAB  Heart: RRR, normal S1 and S2, no loud murmurs  Abdomen: Soft, nontender, positive bowel sounds  Extremities: No edema  Neurologic: No FND  Psychiatric: Normal mood and affect    Assessment/Plan   1-bicuspid aortic valve:  -Patient has been experiencing exertional symptoms, see HPI for details.  -Will obtain MRI of the aorta to rule out arthropathy, and obtain cardiac MRI [last echocardiogram more than a year ago] to rule out valve dysfunction.  -Maintenance of good oral hygiene.  -Emphasized to the patient the need to have her first degree relatives screened by an echocardiogram.    RTC in 1 year.    Louie Pike MD

## 2025-06-18 ENCOUNTER — APPOINTMENT (OUTPATIENT)
Dept: OBSTETRICS AND GYNECOLOGY | Facility: CLINIC | Age: 40
End: 2025-06-18
Payer: COMMERCIAL

## 2025-06-18 VITALS
DIASTOLIC BLOOD PRESSURE: 72 MMHG | SYSTOLIC BLOOD PRESSURE: 100 MMHG | BODY MASS INDEX: 16.83 KG/M2 | HEIGHT: 63 IN | WEIGHT: 95 LBS

## 2025-06-18 DIAGNOSIS — N94.3 PMS (PREMENSTRUAL SYNDROME): Primary | ICD-10-CM

## 2025-06-18 DIAGNOSIS — N64.4 BREAST PAIN: ICD-10-CM

## 2025-06-18 PROCEDURE — 3008F BODY MASS INDEX DOCD: CPT

## 2025-06-18 PROCEDURE — 99213 OFFICE O/P EST LOW 20 MIN: CPT

## 2025-06-18 ASSESSMENT — ENCOUNTER SYMPTOMS
ENDOCRINE NEGATIVE: 0
GASTROINTESTINAL NEGATIVE: 0
ALLERGIC/IMMUNOLOGIC NEGATIVE: 0
CARDIOVASCULAR NEGATIVE: 0
CONSTITUTIONAL NEGATIVE: 0
RESPIRATORY NEGATIVE: 0
EYES NEGATIVE: 0
PSYCHIATRIC NEGATIVE: 0
NEUROLOGICAL NEGATIVE: 0
MUSCULOSKELETAL NEGATIVE: 0
HEMATOLOGIC/LYMPHATIC NEGATIVE: 0

## 2025-06-18 ASSESSMENT — PAIN SCALES - GENERAL: PAINLEVEL_OUTOF10: 2

## 2025-06-18 NOTE — PROGRESS NOTES
"Subjective   Rosa Andrew is a 39 y.o. female pain in both breasts and severe PMS.     Breast Pain  Patient reports that she has bad breast pain. Reports that it started in 2018. She had mammogram at that time which was negative for malignancy. She reports that she was told at the time to stop drinking coffee and she got a less stressful job and saw some improvement. Patient reports that the breast pain is linked to her cycles.     PMS  Patient reports PMS symptoms that are almost like the flu. She reports that the week before her periods she gets very sick with headache and chills. She reports that this has been happening for the last 6 months. Patient reports that the day before her periods have been very difficult for her. She reports not thoughts of self harm but does experience moments of haplessness and worthlessness.     Objective   /72   Ht 1.6 m (5' 3\")   Wt (!) 43.1 kg (95 lb)   LMP 06/16/2025 (Exact Date)      General:   Alert and oriented x 3   Heart:  Lungs: Regular rate, rhythm  Clear to auscultation bilaterally   Abdomen: Soft, non tender     Assessment/Plan   Diagnoses and all orders for this visit:  PMS (premenstrual syndrome)  -     Referral to Psychology; Future  Breast pain  -     BI mammo bilateral screening tomosynthesis; Future    We discussed options for management of cycles with contraceptives, patient declined at this time.   Encouraged to follow up with psych to discussed depressive symptoms.   Rx'd hebert for breast pain  All patient questions answered.   Encouraged to reach out to our office with any questions or concerns.       Cami Childers, ASUNCION-STEVIE   "

## 2025-06-27 DIAGNOSIS — N94.3 PMS (PREMENSTRUAL SYNDROME): Primary | ICD-10-CM

## 2025-06-27 RX ORDER — DROSPIRENONE AND ETHINYL ESTRADIOL 0.02-3(28)
1 KIT ORAL DAILY
Qty: 11 TABLET | Refills: 11 | Status: SHIPPED | OUTPATIENT
Start: 2025-06-27 | End: 2026-06-27

## 2025-08-07 ENCOUNTER — HOSPITAL ENCOUNTER (OUTPATIENT)
Dept: RADIOLOGY | Facility: HOSPITAL | Age: 40
End: 2025-08-07
Payer: COMMERCIAL

## 2025-08-23 ENCOUNTER — OFFICE VISIT (OUTPATIENT)
Dept: URGENT CARE | Age: 40
End: 2025-08-23
Payer: COMMERCIAL

## 2025-08-23 VITALS
RESPIRATION RATE: 18 BRPM | WEIGHT: 100 LBS | TEMPERATURE: 98.9 F | OXYGEN SATURATION: 99 % | BODY MASS INDEX: 17.72 KG/M2 | HEART RATE: 79 BPM | DIASTOLIC BLOOD PRESSURE: 78 MMHG | SYSTOLIC BLOOD PRESSURE: 128 MMHG | HEIGHT: 63 IN

## 2025-08-23 DIAGNOSIS — H66.92 LEFT OTITIS MEDIA, UNSPECIFIED OTITIS MEDIA TYPE: Primary | ICD-10-CM

## 2025-08-23 PROCEDURE — 99203 OFFICE O/P NEW LOW 30 MIN: CPT | Performed by: PHYSICIAN ASSISTANT

## 2025-08-23 PROCEDURE — 3008F BODY MASS INDEX DOCD: CPT | Performed by: PHYSICIAN ASSISTANT

## 2025-08-23 RX ORDER — AMOXICILLIN 875 MG/1
875 TABLET, COATED ORAL 2 TIMES DAILY
Qty: 14 TABLET | Refills: 0 | Status: SHIPPED | OUTPATIENT
Start: 2025-08-23 | End: 2025-08-30

## 2025-08-23 ASSESSMENT — ENCOUNTER SYMPTOMS
SORE THROAT: 0
WEAKNESS: 0
RHINORRHEA: 1
DIZZINESS: 1
CHILLS: 0
NUMBNESS: 0
COUGH: 0
SHORTNESS OF BREATH: 0
HEADACHES: 0
DIAPHORESIS: 0
FEVER: 0

## 2025-08-23 ASSESSMENT — PAIN SCALES - GENERAL: PAINLEVEL_OUTOF10: 4

## 2025-09-11 ENCOUNTER — APPOINTMENT (OUTPATIENT)
Dept: PRIMARY CARE | Facility: CLINIC | Age: 40
End: 2025-09-11
Payer: COMMERCIAL